# Patient Record
Sex: MALE | Race: WHITE | Employment: OTHER | URBAN - METROPOLITAN AREA
[De-identification: names, ages, dates, MRNs, and addresses within clinical notes are randomized per-mention and may not be internally consistent; named-entity substitution may affect disease eponyms.]

---

## 2017-10-05 ENCOUNTER — OFFICE VISIT (OUTPATIENT)
Dept: INTERNAL MEDICINE CLINIC | Age: 77
End: 2017-10-05
Payer: MEDICARE

## 2017-10-05 VITALS
BODY MASS INDEX: 26.41 KG/M2 | SYSTOLIC BLOOD PRESSURE: 134 MMHG | WEIGHT: 195 LBS | HEIGHT: 72 IN | DIASTOLIC BLOOD PRESSURE: 68 MMHG | OXYGEN SATURATION: 95 % | HEART RATE: 48 BPM

## 2017-10-05 DIAGNOSIS — Z00.00 ROUTINE ADULT HEALTH MAINTENANCE: ICD-10-CM

## 2017-10-05 DIAGNOSIS — G89.29 CHRONIC MIDLINE LOW BACK PAIN WITH SCIATICA, SCIATICA LATERALITY UNSPECIFIED: ICD-10-CM

## 2017-10-05 DIAGNOSIS — G62.9 PERIPHERAL POLYNEUROPATHY: ICD-10-CM

## 2017-10-05 DIAGNOSIS — M54.40 CHRONIC MIDLINE LOW BACK PAIN WITH SCIATICA, SCIATICA LATERALITY UNSPECIFIED: ICD-10-CM

## 2017-10-05 DIAGNOSIS — E11.9 TYPE 2 DIABETES MELLITUS WITHOUT COMPLICATION, WITHOUT LONG-TERM CURRENT USE OF INSULIN (HCC): ICD-10-CM

## 2017-10-05 DIAGNOSIS — E03.9 HYPOTHYROIDISM, UNSPECIFIED TYPE: ICD-10-CM

## 2017-10-05 DIAGNOSIS — Z23 NEED FOR VACCINATION: Primary | ICD-10-CM

## 2017-10-05 PROCEDURE — G0009 ADMIN PNEUMOCOCCAL VACCINE: HCPCS | Performed by: INTERNAL MEDICINE

## 2017-10-05 PROCEDURE — 99204 OFFICE O/P NEW MOD 45 MIN: CPT | Performed by: INTERNAL MEDICINE

## 2017-10-05 PROCEDURE — 90662 IIV NO PRSV INCREASED AG IM: CPT | Performed by: INTERNAL MEDICINE

## 2017-10-05 PROCEDURE — G0008 ADMIN INFLUENZA VIRUS VAC: HCPCS | Performed by: INTERNAL MEDICINE

## 2017-10-05 PROCEDURE — 90670 PCV13 VACCINE IM: CPT | Performed by: INTERNAL MEDICINE

## 2017-10-05 RX ORDER — DOCUSATE SODIUM 100 MG/1
100 CAPSULE, LIQUID FILLED ORAL DAILY
COMMUNITY
End: 2021-06-08 | Stop reason: ALTCHOICE

## 2017-10-05 RX ORDER — TRAMADOL HYDROCHLORIDE 50 MG/1
50 TABLET ORAL EVERY 4 HOURS
COMMUNITY
End: 2019-01-10 | Stop reason: ALTCHOICE

## 2017-10-05 RX ORDER — LANOLIN ALCOHOL/MO/W.PET/CERES
1000 CREAM (GRAM) TOPICAL DAILY
COMMUNITY
End: 2021-06-08

## 2017-10-05 RX ORDER — PREGABALIN 50 MG/1
50 CAPSULE ORAL 3 TIMES DAILY
COMMUNITY
End: 2017-11-06 | Stop reason: SDUPTHER

## 2017-10-05 RX ORDER — GLUCOSAMINE HCL/CHONDROITIN SU 500-400 MG
CAPSULE ORAL
Qty: 100 STRIP | Refills: 0 | Status: SHIPPED | OUTPATIENT
Start: 2017-10-05 | End: 2018-01-22 | Stop reason: SDUPTHER

## 2017-10-05 RX ORDER — MULTIVIT WITH MINERALS/LUTEIN
1000 TABLET ORAL DAILY
COMMUNITY
End: 2021-06-08

## 2017-10-05 RX ORDER — ASPIRIN 325 MG
325 TABLET ORAL EVERY 6 HOURS PRN
Status: ON HOLD | COMMUNITY
End: 2020-02-10 | Stop reason: HOSPADM

## 2017-10-05 RX ORDER — ALPRAZOLAM 0.25 MG/1
0.25 TABLET ORAL PRN
COMMUNITY
End: 2018-06-28

## 2017-10-05 RX ORDER — MELATONIN
1000 DAILY
COMMUNITY
End: 2021-06-08

## 2017-10-05 RX ORDER — LEVOTHYROXINE SODIUM 0.12 MG/1
125 TABLET ORAL DAILY
COMMUNITY
End: 2017-11-06 | Stop reason: SDUPTHER

## 2017-10-05 RX ORDER — LATANOPROST 50 UG/ML
1 SOLUTION/ DROPS OPHTHALMIC DAILY
COMMUNITY
End: 2020-04-20 | Stop reason: ALTCHOICE

## 2017-10-05 ASSESSMENT — ENCOUNTER SYMPTOMS
COLOR CHANGE: 0
FACIAL SWELLING: 0
ABDOMINAL PAIN: 0
SHORTNESS OF BREATH: 0
APNEA: 0
CONSTIPATION: 0
CHEST TIGHTNESS: 0
COUGH: 0
ABDOMINAL DISTENTION: 0
BACK PAIN: 1
DIARRHEA: 0
WHEEZING: 0

## 2017-10-05 ASSESSMENT — PATIENT HEALTH QUESTIONNAIRE - PHQ9
SUM OF ALL RESPONSES TO PHQ9 QUESTIONS 1 & 2: 2
2. FEELING DOWN, DEPRESSED OR HOPELESS: 1
SUM OF ALL RESPONSES TO PHQ QUESTIONS 1-9: 2
1. LITTLE INTEREST OR PLEASURE IN DOING THINGS: 1

## 2017-10-05 NOTE — PROGRESS NOTES
Subjective:      Patient ID: Marino Hu is a 76 y.o. male. Visit Information    Have you changed or started any medications since your last visit including any over-the-counter medicines, vitamins, or herbal medicines? no   Are you having any side effects from any of your medications? -  no  Have you stopped taking any of your medications? Is so, why? -  no    Have you seen any other physician or provider since your last visit? No  Have you had any other diagnostic tests since your last visit? No  Have you been seen in the emergency room and/or had an admission to a hospital since we last saw you? No  Have you had your routine dental cleaning in the past 6 months? no    Have you activated your [employer] account? If not, what are your barriers? No: Inactive     Patient Care Team:  Tamiko Fernando MD as PCP - General (Internal Medicine)    Medical History Review  Past Medical, Family, and Social History reviewed and does not contribute to the patient presenting condition    Health Maintenance   Topic Date Due    DTaP/Tdap/Td vaccine (1 - Tdap) 09/20/1957    Lipid screen  09/20/1978    Zostavax vaccine  09/20/1998    Pneumococcal low/med risk (1 of 2 - PCV13) 09/20/2003    Flu vaccine (1) 09/01/2017     Chief Complaint   Patient presents with   [facility] Doctor     new to office    Other     pt due for pneumo and flu shot       HPI- patient is here to establish care. He has multiple medical problems which include diabetes, hypothyroidism, chronic back pain. Patient mentioned that he is not taking any medication for long time. He is checking his blood sugars every day, most of his fasting readings are within 120 and 130. He has seen by the orthopedic surgeon in [facility], he has taken multiple shots in his back. He also mentioned that he has bad peripheral neuropathy, likely secondary to diabetes. Patient is taking Lyrica for that .     Review of Systems   Constitutional: Negative for activity change, appetite change, chills and diaphoresis. HENT: Negative for congestion, dental problem, ear discharge, facial swelling and hearing loss. Respiratory: Negative for apnea, cough, chest tightness, shortness of breath and wheezing. Cardiovascular: Negative for chest pain and leg swelling. Gastrointestinal: Negative for abdominal distention, abdominal pain, constipation and diarrhea. Genitourinary: Negative for difficulty urinating, dysuria, enuresis, flank pain and frequency. Musculoskeletal: Positive for arthralgias (Both shoulders and LOwer Back ) and back pain. Negative for gait problem and joint swelling. Skin: Negative for color change, pallor and rash. Neurological: Positive for numbness (both legs ). Negative for dizziness, seizures, facial asymmetry, light-headedness and headaches. Psychiatric/Behavioral: Negative for agitation, behavioral problems, confusion, decreased concentration and dysphoric mood. Objective:   Physical Exam   Constitutional: He is oriented to person, place, and time. He appears well-developed and well-nourished. No distress. HENT:   Head: Normocephalic and atraumatic. Mouth/Throat: No oropharyngeal exudate. Eyes: Conjunctivae are normal. Pupils are equal, round, and reactive to light. Right eye exhibits no discharge. Left eye exhibits no discharge. No scleral icterus. Neck: Normal range of motion. Neck supple. No JVD present. No tracheal deviation present. No thyromegaly present. Cardiovascular: Normal rate and normal heart sounds. Exam reveals no gallop. No murmur heard. Pulmonary/Chest: Effort normal and breath sounds normal. No stridor. No respiratory distress. He has no wheezes. He has no rales. Abdominal: Soft. Bowel sounds are normal. He exhibits no distension. There is no tenderness. There is no rebound and no guarding. Musculoskeletal: Normal range of motion. He exhibits tenderness (LOwer back ). He exhibits no edema.    Neurological: He is alert and oriented to person, place, and time. Skin: Skin is warm and dry. No rash noted. He is not diaphoretic. No erythema. Nursing note and vitals reviewed. Assessment:       1. Need for vaccination    2. Routine adult health maintenance    3. Chronic midline low back pain with sciatica, sciatica laterality unspecified    4. Type 2 diabetes mellitus without complication, without long-term current use of insulin ([facility]    5. Hypothyroidism, unspecified type    6. Peripheral polyneuropathy ([facility] [address]              Plan:      1. Need for vaccination  - Pneumococcal conjugate vaccine 13-valent IM (PREVNAR 13)  - INFLUENZA, HIGH DOSE, 65 YRS +, IM, PF, PREFILL SYR, 0.5ML (FLUZONE HD)    2. Routine adult health maintenance  - Lipid Panel; Future  - Comprehensive Metabolic Panel; Future  - CBC; Future    3. Chronic midline low back pain with sciatica, sciatica laterality unspecified  - Handicap placard    Physical therapy referral    4. Type 2 diabetes mellitus without complication, without long-term current use of insulin (HCC)  - Hemoglobin A1C; Future  - Microalbumin, Ur  - Glucose Blood (BLOOD GLUCOSE TEST STRIPS) STRP; Use 2-3 times a day  Dispense: 100 strip; Refill: 0    5. Hypothyroidism, unspecified type  - TSH With Reflex Ft4; Future    6. Peripheral polyneuropathy ([facility] [address]- patient is taking Lyrica     Patient is coming for stent to the office, he is requesting to sign a document for income taxes redemption on disability grounds, I would have for him to physical therapist for assessment of his function capacity. · Return in about 4 weeks (around 11/2/2017). · Benigno Valdez received counseling on the following healthy behaviors: nutrition, exercise and medication adherence    · Reviewed prior labs and health maintenance. · Discussed use, benefit, and side effects of prescribed medications. Barriers to medication compliance addressed. All patient questions answered. Pt voiced understanding.

## 2017-10-05 NOTE — MR AVS SNAPSHOT
After Visit Summary             Tom Truong   10/5/2017 10:00 AM   Office Visit    Description:  Male : 1940   Provider:  Marjorie Rogers MD   Department:  [facility]              Your Follow-Up and Future Appointments         Below is a list of your follow-up and future appointments. This may not be a complete list as you may have made appointments directly with providers that we are not aware of or your providers may have made some for you. Please call your providers to confirm appointments. It is important to keep your appointments. Please bring your current insurance card, photo ID, co-pay, and all medication bottles to your appointment. If self-pay, payment is expected at the time of service. Your To-Do List     Future Appointments Provider Department Dept Phone    2017 10:00 AM Marjorie Rogers MD [facility] [phone]    Please arrive 15 minutes prior to appointment, bring photo ID and insurance card. Future Orders Complete By Expires    CBC [SQF960 Custom]  10/5/2017 10/5/2018    Hemoglobin A1C [LAB90 Custom]  10/5/2017 10/5/2018    Lipid Panel [LAB18 Custom]  10/5/2017 10/5/2018    TSH With Reflex Ft4 [YJS9062 Custom]  10/5/2017 10/5/2018    Comprehensive Metabolic Panel [RMT23 Custom]  2018 10/4/2018    Comments:    Please get this labwork done before your next visit. Follow-Up    Return in about 4 weeks (around 2017).          Information from Your Visit        Department     Name Address Phone Fax    [address] [address] [phone]      You Were Seen for:         Comments    Need for vaccination   [[id number]]         Vital Signs     Blood Pressure Pulse Height Weight Oxygen Saturation Body Mass Index    134/68 48 6' (1.829 m) 195 lb (88.5 kg) 95% 26.45 kg/m2    Smoking Status                   Former Smoker           Additional Information about your Body Mass Index (BMI) Your BMI as listed above is considered overweight (25.0-29.9). BMI is an estimate of body fat, calculated from your height and weight. The higher your BMI, the greater your risk of heart disease, high blood pressure, type 2 diabetes, stroke, gallstones, arthritis, sleep apnea, and certain cancers. BMI is not perfect. It may overestimate body fat in athletes and people who are more muscular. If your body fat is high you can improve your BMI by decreasing your calorie intake and becoming more physically active. Learn more at: [employer] [place]             Today's Medication Changes          These changes are accurate as of: 10/5/17 11:12 AM.  If you have any questions, ask your nurse or doctor.                START taking these medications           BLOOD GLUCOSE TEST STRIPS Strp   Instructions:  Use 2-3 times a day   Quantity:  100 strip   Refills:  0   Started by:  Marjorie Rogers MD            Where to Get Your Medications      These medications were sent to [facility], [address] [address]     Phone:  [phone]     BLOOD GLUCOSE TEST STRIPS Strp               Your Current Medications Are              traMADol (ULTRAM) 50 MG tablet Take 50 mg by mouth every 4 hours    ALPRAZolam (XANAX) 0.25 MG tablet Take 0.25 mg by mouth as needed for Sleep    latanoprost (XALATAN) 0.005 % ophthalmic solution Place 1 drop into both eyes daily    pregabalin (LYRICA) 50 MG capsule Take 50 mg by mouth 3 times daily    levothyroxine (SYNTHROID) 125 MCG tablet Take 125 mcg by mouth Daily    ROSUVASTATIN CALCIUM PO Take by mouth    insulin regular (HUMULIN R;NOVOLIN R) 100 UNIT/ML injection Inject into the skin See Admin Instructions    Naproxen Sodium (ALEVE PO) Take by mouth    aspirin 325 MG tablet Take 325 mg by mouth daily [email or web address] Access Code: [email or web address]  Expires: 12/4/2017 11:12 AM    4. Enter your Social Security Number ([id number]) and Date of Birth (mm/dd/yyyy) as indicated and click Submit. You will be taken to the next sign-up page. 5. Create a [email or web address] ID. This will be your [email or web address] login ID and cannot be changed, so think of one that is secure and easy to remember. 6. Create a [email or web address] password. You can change your password at any time. 7. Enter your Password Reset Question and Answer. This can be used at a later time if you forget your password. 8. Enter your e-mail address. You will receive e-mail notification when new information is available in [address] 9. Click Sign Up. You can now view your medical record. Additional Information  If you have questions, please contact the physician practice where you receive care. Remember, [email or web address] is NOT to be used for urgent needs. For medical emergencies, dial 911. For questions regarding your [email or web address] account call [phone]. If you have a clinical question, please call your doctor's office.

## 2017-10-09 ENCOUNTER — HOSPITAL ENCOUNTER (OUTPATIENT)
Age: 77
Setting detail: SPECIMEN
Discharge: HOME | End: 2017-10-09
Payer: MEDICARE

## 2017-10-09 DIAGNOSIS — E11.9 TYPE 2 DIABETES MELLITUS WITHOUT COMPLICATION, WITHOUT LONG-TERM CURRENT USE OF INSULIN (HCC): ICD-10-CM

## 2017-10-09 DIAGNOSIS — E03.9 HYPOTHYROIDISM, UNSPECIFIED TYPE: ICD-10-CM

## 2017-10-09 DIAGNOSIS — Z00.00 ROUTINE ADULT HEALTH MAINTENANCE: ICD-10-CM

## 2017-10-09 LAB
ALBUMIN SERPL-MCNC: 4.1 G/DL (ref 3.5–5.2)
ALBUMIN/GLOBULIN RATIO: 1.5 (ref 1–2.5)
ALP BLD-CCNC: 82 U/L (ref 40–129)
ALT SERPL-CCNC: 43 U/L (ref 5–41)
ANION GAP SERPL CALCULATED.3IONS-SCNC: 11 MMOL/L (ref 9–17)
AST SERPL-CCNC: 37 U/L
BILIRUB SERPL-MCNC: 1 MG/DL (ref 0.3–1.2)
BUN BLDV-MCNC: 11 MG/DL (ref 8–23)
BUN/CREAT BLD: ABNORMAL (ref 9–20)
CALCIUM SERPL-MCNC: 9.7 MG/DL (ref 8.6–10.4)
CHLORIDE BLD-SCNC: 105 MMOL/L (ref 98–107)
CHOLESTEROL/HDL RATIO: 3.2
CHOLESTEROL: 119 MG/DL
CO2: 26 MMOL/L (ref 20–31)
CREAT SERPL-MCNC: 0.51 MG/DL (ref 0.7–1.2)
ESTIMATED AVERAGE GLUCOSE: 146 MG/DL
GFR AFRICAN AMERICAN: >60 ML/MIN
GFR NON-AFRICAN AMERICAN: >60 ML/MIN
GFR SERPL CREATININE-BSD FRML MDRD: ABNORMAL ML/MIN/{1.73_M2}
GFR SERPL CREATININE-BSD FRML MDRD: ABNORMAL ML/MIN/{1.73_M2}
GLUCOSE BLD-MCNC: 127 MG/DL (ref 70–99)
HBA1C MFR BLD: 6.7 % (ref 4–6)
HCT VFR BLD CALC: 42.5 % (ref 41–53)
HDLC SERPL-MCNC: 37 MG/DL
HEMOGLOBIN: 14.8 G/DL (ref 13.5–17.5)
LDL CHOLESTEROL: 58 MG/DL (ref 0–130)
MCH RBC QN AUTO: 30.6 PG (ref 26–34)
MCHC RBC AUTO-ENTMCNC: 34.7 G/DL (ref 31–37)
MCV RBC AUTO: 88.4 FL (ref 80–100)
PDW BLD-RTO: 13.6 % (ref 12.5–15.4)
PLATELET # BLD: 241 K/UL (ref 140–450)
PMV BLD AUTO: 7.4 FL (ref 6–12)
POTASSIUM SERPL-SCNC: 4.2 MMOL/L (ref 3.7–5.3)
RBC # BLD: 4.81 M/UL (ref 4.5–5.9)
SODIUM BLD-SCNC: 142 MMOL/L (ref 135–144)
TOTAL PROTEIN: 6.9 G/DL (ref 6.4–8.3)
TRIGL SERPL-MCNC: 122 MG/DL
TSH SERPL DL<=0.05 MIU/L-ACNC: 1.79 MIU/L (ref 0.3–5)
VLDLC SERPL CALC-MCNC: ABNORMAL MG/DL (ref 1–30)
WBC # BLD: 5.6 K/UL (ref 3.5–11)

## 2017-11-06 ENCOUNTER — OFFICE VISIT (OUTPATIENT)
Dept: INTERNAL MEDICINE CLINIC | Age: 77
End: 2017-11-06
Payer: MEDICARE

## 2017-11-06 ENCOUNTER — TELEPHONE (OUTPATIENT)
Dept: INTERNAL MEDICINE CLINIC | Age: 77
End: 2017-11-06

## 2017-11-06 VITALS
DIASTOLIC BLOOD PRESSURE: 84 MMHG | OXYGEN SATURATION: 98 % | HEART RATE: 48 BPM | HEIGHT: 72 IN | BODY MASS INDEX: 26.14 KG/M2 | SYSTOLIC BLOOD PRESSURE: 134 MMHG | WEIGHT: 193 LBS

## 2017-11-06 DIAGNOSIS — G62.9 PERIPHERAL POLYNEUROPATHY: ICD-10-CM

## 2017-11-06 DIAGNOSIS — E03.9 HYPOTHYROIDISM, UNSPECIFIED TYPE: ICD-10-CM

## 2017-11-06 DIAGNOSIS — E11.9 TYPE 2 DIABETES MELLITUS WITHOUT COMPLICATION, WITHOUT LONG-TERM CURRENT USE OF INSULIN (HCC): Primary | ICD-10-CM

## 2017-11-06 DIAGNOSIS — R00.1 BRADYCARDIA: ICD-10-CM

## 2017-11-06 DIAGNOSIS — K21.9 GASTROESOPHAGEAL REFLUX DISEASE WITHOUT ESOPHAGITIS: ICD-10-CM

## 2017-11-06 PROCEDURE — 99213 OFFICE O/P EST LOW 20 MIN: CPT | Performed by: INTERNAL MEDICINE

## 2017-11-06 RX ORDER — LEVOTHYROXINE SODIUM 0.12 MG/1
125 TABLET ORAL DAILY
Qty: 30 TABLET | Refills: 3 | Status: SHIPPED | OUTPATIENT
Start: 2017-11-06 | End: 2018-03-21 | Stop reason: SDUPTHER

## 2017-11-06 RX ORDER — PREGABALIN 25 MG/1
50 CAPSULE ORAL 2 TIMES DAILY
Qty: 60 CAPSULE | Refills: 2 | Status: SHIPPED | OUTPATIENT
Start: 2017-11-06 | End: 2018-04-16 | Stop reason: SDUPTHER

## 2017-11-06 ASSESSMENT — ENCOUNTER SYMPTOMS
BACK PAIN: 1
WHEEZING: 0
ABDOMINAL DISTENTION: 0
ABDOMINAL PAIN: 0
COUGH: 0
COLOR CHANGE: 0
APNEA: 0
DIARRHEA: 0
FACIAL SWELLING: 0
SHORTNESS OF BREATH: 0
CHEST TIGHTNESS: 0
CONSTIPATION: 0

## 2017-11-06 NOTE — PROGRESS NOTES
Subjective:      Patient ID: Lexis Evans is a 76 y.o. male. HPI- patient is here for follow-up examination. He was seen earlier last month, he has diet-controlled diabetes and hypothyroidism. He was not taking his Synthroid for over 1 week. He has lab work done recently, no abnormality detected    Review of Systems   Constitutional: Negative for activity change, appetite change, chills and diaphoresis. HENT: Negative for congestion, dental problem, ear discharge, facial swelling and hearing loss. Respiratory: Negative for apnea, cough, chest tightness, shortness of breath and wheezing. Cardiovascular: Negative for chest pain and leg swelling. Gastrointestinal: Negative for abdominal distention, abdominal pain, constipation and diarrhea. Abdominal Burning sensation      Genitourinary: Negative for difficulty urinating, dysuria, enuresis, flank pain and frequency. Musculoskeletal: Positive for arthralgias, back pain and gait problem. Negative for joint swelling. Uses cane      Skin: Negative for color change, pallor and rash. Neurological: Negative for dizziness, seizures, facial asymmetry, light-headedness, numbness and headaches. Psychiatric/Behavioral: Negative for agitation, behavioral problems, confusion, decreased concentration and dysphoric mood. Objective:   Physical Exam   Constitutional: He is oriented to person, place, and time. He appears well-developed and well-nourished. HENT:   Head: Normocephalic and atraumatic. Mouth/Throat: No oropharyngeal exudate. Eyes: Conjunctivae are normal. Pupils are equal, round, and reactive to light. Right eye exhibits no discharge. Left eye exhibits no discharge. No scleral icterus. Neck: Normal range of motion. Neck supple. No JVD present. No tracheal deviation present. No thyromegaly present. Cardiovascular: Normal rate and normal heart sounds. Exam reveals no gallop. No murmur heard.   Pulmonary/Chest: Effort normal and breath sounds normal. No stridor. No respiratory distress. He has no wheezes. He has no rales. He exhibits no tenderness. Abdominal: Soft. Bowel sounds are normal. He exhibits no distension. There is no tenderness. There is no rebound and no guarding. Musculoskeletal: Normal range of motion. He exhibits tenderness (back lower ). He exhibits no edema. Neurological: He is alert and oriented to person, place, and time. Skin: He is not diaphoretic. Assessment:       1. Type 2 diabetes mellitus without complication, without long-term current use of insulin ([address]    2. Hypothyroidism, unspecified type    3. Peripheral polyneuropathy ([address]    4. Gastroesophageal reflux disease without esophagitis    5. Bradycardia            Plan:     1. Type 2 diabetes mellitus without complication, without long-term current use of insulin ([address]- diet controlled, last HbA1c was 6.7    2. Hypothyroidism, unspecified type  - levothyroxine (SYNTHROID) 125 MCG tablet; Take 1 tablet by mouth daily  Dispense: 30 tablet; Refill: 3    3. Peripheral polyneuropathy ([address]    Patient is taking Lyrica as and when needed, he was explained that this medication is to be taken every day  Reduced the dose from 50 mg 3 times a day to 25 mg twice a day  - pregabalin (LYRICA) 25 MG capsule; Take 2 capsules by mouth 2 times daily  Dispense: 60 capsule; Refill: 2    4. Gastroesophageal reflux disease without esophagitis- patient does not want to take any medication for his GERD  Advised to cut down coffee intake, literature   provided    5. Bradycardia- his heart rate was 48, EKG was done in the office suggestive of bradycardia    · Return in about 3 months (around 2/6/2018). · Rosio Lazar received counseling on the following healthy behaviors: nutrition, exercise and medication adherence    · Reviewed prior labs and health maintenance. · Discussed use, benefit, and side effects of prescribed medications.   Barriers to medication

## 2017-11-06 NOTE — TELEPHONE ENCOUNTER
I have discussion with the patient, he wanted to on the dose to come from 50 mg 3 times a day to 25 mg twice a day.   Please addend the prescription

## 2017-11-06 NOTE — PATIENT INSTRUCTIONS
Patient Education        Gastroesophageal Reflux Disease (GERD): Care Instructions  Your Care Instructions    Gastroesophageal reflux disease (GERD) is the backward flow of stomach acid into the esophagus. The esophagus is the tube that leads from your throat to your stomach. A one-way valve prevents the stomach acid from moving up into this tube. When you have GERD, this valve does not close tightly enough. If you have mild GERD symptoms including heartburn, you may be able to control the problem with antacids or over-the-counter medicine. Changing your diet, losing weight, and making other lifestyle changes can also help reduce symptoms. Follow-up care is a key part of your treatment and safety. Be sure to make and go to all appointments, and call your doctor if you are having problems. Its also a good idea to know your test results and keep a list of the medicines you take. How can you care for yourself at home? · Take your medicines exactly as prescribed. Call your doctor if you think you are having a problem with your medicine. · Your doctor may recommend over-the-counter medicine. For mild or occasional indigestion, antacids, such as Tums, Gaviscon, Mylanta, or Maalox, may help. Your doctor also may recommend over-the-counter acid reducers, such as Pepcid AC, Tagamet HB, Zantac 75, or Prilosec. Read and follow all instructions on the label. If you use these medicines often, talk with your doctor. · Change your eating habits. ¨ Its best to eat several small meals instead of two or three large meals. ¨ After you eat, wait 2 to 3 hours before you lie down. ¨ Chocolate, mint, and alcohol can make GERD worse. ¨ Spicy foods, foods that have a lot of acid (like tomatoes and oranges), and coffee can make GERD symptoms worse in some people. If your symptoms are worse after you eat a certain food, you may want to stop eating that food to see if your symptoms get better.   · Do not smoke or chew tobacco.

## 2017-11-06 NOTE — TELEPHONE ENCOUNTER
lyrica was prescribed today. Came to pharmacy as 25mg take 2 tablets BID but only a qty of 60. Is he supposed to be taking 50mg BID? Or just 25mg BID?

## 2017-11-06 NOTE — PROGRESS NOTES
Subjective:      Patient ID: Yvoana lilia Grider is a 76 y.o. male. Chronic Disease Visit Information    BP Readings from Last 3 Encounters:   11/06/17 134/84   10/05/17 134/68          Hemoglobin A1C (%)   Date Value   10/09/2017 6.7 (H)     LDL Cholesterol (mg/dL)   Date Value   10/09/2017 58     HDL (mg/dL)   Date Value   10/09/2017 37 (L)     BUN (mg/dL)   Date Value   10/09/2017 11     CREATININE (mg/dL)   Date Value   10/09/2017 0.51 (L)     Glucose (mg/dL)   Date Value   10/09/2017 127 (H)            Have you changed or started any medications since your last visit including any over-the-counter medicines, vitamins, or herbal medicines? no   Are you having any side effects from any of your medications? -  no  Have you stopped taking any of your medications? Is so, why? -  no    Have you seen any other physician or provider since your last visit? No  Have you had any other diagnostic tests since your last visit? No  Have you been seen in the emergency room and/or had an admission to a hospital since we last saw you? No  Have you had your annual diabetic retinal (eye) exam? Yes  Have you had your routine dental cleaning in the past 6 months? no    Have you activated your [employer] account? If not, what are your barriers?  No: Pending     Patient Care Team:  Nas Roque MD as PCP - General (Internal Medicine)         Medical History Review  Past Medical, Family, and Social History reviewed and does not contribute to the patient presenting condition    Health Maintenance   Topic Date Due    DTaP/Tdap/Td vaccine (1 - Tdap) 09/20/1957    Zostavax vaccine  09/20/1998    Pneumococcal low/med risk (2 of 2 - PPSV23) 10/05/2018    Lipid screen  10/09/2022    Flu vaccine  Completed     Chief Complaint   Patient presents with    1 Month Follow-Up     pt here for 1 month check up    Diabetes     management pt due for microalbumine but unable to give urine sample       HPI    Review of Systems    Objective:   Physical Exam    Assessment:            Plan:

## 2018-01-22 DIAGNOSIS — E11.9 TYPE 2 DIABETES MELLITUS WITHOUT COMPLICATION, WITHOUT LONG-TERM CURRENT USE OF INSULIN (HCC): ICD-10-CM

## 2018-01-22 RX ORDER — GLUCOSAMINE HCL/CHONDROITIN SU 500-400 MG
CAPSULE ORAL
Qty: 100 STRIP | Refills: 0 | Status: SHIPPED | OUTPATIENT
Start: 2018-01-22 | End: 2018-04-16 | Stop reason: SDUPTHER

## 2018-02-20 ENCOUNTER — OFFICE VISIT (OUTPATIENT)
Dept: INTERNAL MEDICINE CLINIC | Age: 78
End: 2018-02-20
Payer: MEDICARE

## 2018-02-20 VITALS
DIASTOLIC BLOOD PRESSURE: 82 MMHG | HEART RATE: 74 BPM | HEIGHT: 72 IN | BODY MASS INDEX: 26.55 KG/M2 | SYSTOLIC BLOOD PRESSURE: 128 MMHG | WEIGHT: 196 LBS | OXYGEN SATURATION: 96 %

## 2018-02-20 DIAGNOSIS — K21.00 GASTROESOPHAGEAL REFLUX DISEASE WITH ESOPHAGITIS: ICD-10-CM

## 2018-02-20 DIAGNOSIS — G89.29 CHRONIC MIDLINE LOW BACK PAIN WITH SCIATICA, SCIATICA LATERALITY UNSPECIFIED: ICD-10-CM

## 2018-02-20 DIAGNOSIS — E03.9 HYPOTHYROIDISM, UNSPECIFIED TYPE: ICD-10-CM

## 2018-02-20 DIAGNOSIS — M54.40 CHRONIC MIDLINE LOW BACK PAIN WITH SCIATICA, SCIATICA LATERALITY UNSPECIFIED: ICD-10-CM

## 2018-02-20 DIAGNOSIS — E11.9 TYPE 2 DIABETES MELLITUS WITHOUT COMPLICATION, WITHOUT LONG-TERM CURRENT USE OF INSULIN (HCC): Primary | ICD-10-CM

## 2018-02-20 LAB — HBA1C MFR BLD: 6.6 %

## 2018-02-20 PROCEDURE — 83036 HEMOGLOBIN GLYCOSYLATED A1C: CPT | Performed by: INTERNAL MEDICINE

## 2018-02-20 PROCEDURE — 99214 OFFICE O/P EST MOD 30 MIN: CPT | Performed by: INTERNAL MEDICINE

## 2018-02-20 RX ORDER — CHLORAL HYDRATE 500 MG
1000 CAPSULE ORAL DAILY
COMMUNITY
End: 2021-06-08

## 2018-02-20 RX ORDER — OMEPRAZOLE 20 MG/1
20 CAPSULE, DELAYED RELEASE ORAL 2 TIMES DAILY
Qty: 30 CAPSULE | Refills: 3 | Status: SHIPPED | OUTPATIENT
Start: 2018-02-20 | End: 2018-06-28

## 2018-02-20 ASSESSMENT — ENCOUNTER SYMPTOMS
WHEEZING: 0
SHORTNESS OF BREATH: 0
COUGH: 0
FACIAL SWELLING: 0
CHEST TIGHTNESS: 0
DIARRHEA: 0
BACK PAIN: 1
ABDOMINAL DISTENTION: 0
APNEA: 0
COLOR CHANGE: 0
CONSTIPATION: 0
ABDOMINAL PAIN: 1

## 2018-02-20 NOTE — PROGRESS NOTES
sounds are normal. He exhibits no distension. There is no tenderness. There is no rebound and no guarding. Musculoskeletal: Normal range of motion. He exhibits no edema or tenderness. Neurological: He is alert and oriented to person, place, and time. Skin: Skin is warm and dry. No rash noted. He is not diaphoretic. No erythema. Nursing note and vitals reviewed. Assessment:       1. Type 2 diabetes mellitus without complication, without long-term current use of insulin ([address]    2. Hypothyroidism, unspecified type    3. Gastroesophageal reflux disease with esophagitis    4. Chronic midline low back pain with sciatica, sciatica laterality unspecified              Plan:     1. Type 2 diabetes mellitus without complication, without long-term current use of insulin ([facility])  - POCT glycosylated hemoglobin (Hb A1C)    2. Hypothyroidism, unspecified type  - TSH; Future    3. Gastroesophageal reflux disease with esophagitis    - omeprazole (PRILOSEC) 20 MG delayed release capsule; Take 1 capsule by mouth 2 times daily  Dispense: 30 capsule; Refill: 3    4. Chronic midline low back pain with sciatica, sciatica laterality unspecified- on Aspirin PRN      · Return in about 4 months (around 6/20/2018). · Reviewed prior labs and health maintenance. · Discussed use, benefit, and side effects of prescribed medications. Barriers to medication compliance addressed. All patient questions answered. Pt voiced understanding.          MD RYNE Pearl [facility]  2/20/2018, 11:45 AM

## 2018-02-23 ENCOUNTER — HOSPITAL ENCOUNTER (OUTPATIENT)
Age: 78
Setting detail: SPECIMEN
Discharge: HOME | End: 2018-02-23
Payer: MEDICARE

## 2018-02-23 DIAGNOSIS — E03.9 HYPOTHYROIDISM, UNSPECIFIED TYPE: ICD-10-CM

## 2018-02-23 LAB — TSH SERPL DL<=0.05 MIU/L-ACNC: 1.9 MIU/L (ref 0.3–5)

## 2018-03-21 DIAGNOSIS — E03.9 HYPOTHYROIDISM, UNSPECIFIED TYPE: ICD-10-CM

## 2018-03-22 RX ORDER — LEVOTHYROXINE SODIUM 0.12 MG/1
TABLET ORAL
Qty: 30 TABLET | Refills: 11 | Status: SHIPPED | OUTPATIENT
Start: 2018-03-22 | End: 2019-03-13 | Stop reason: SDUPTHER

## 2018-04-16 DIAGNOSIS — G62.9 PERIPHERAL POLYNEUROPATHY: ICD-10-CM

## 2018-04-16 DIAGNOSIS — E11.9 TYPE 2 DIABETES MELLITUS WITHOUT COMPLICATION, WITHOUT LONG-TERM CURRENT USE OF INSULIN (HCC): ICD-10-CM

## 2018-04-16 DIAGNOSIS — Z79.899 DRUG THERAPY: Primary | ICD-10-CM

## 2018-04-16 RX ORDER — BLOOD SUGAR DIAGNOSTIC
STRIP MISCELLANEOUS
Qty: 100 STRIP | Refills: 3 | Status: SHIPPED | OUTPATIENT
Start: 2018-04-16 | End: 2019-05-16 | Stop reason: SDUPTHER

## 2018-04-17 RX ORDER — PREGABALIN 25 MG/1
50 CAPSULE ORAL 2 TIMES DAILY
Qty: 120 CAPSULE | Refills: 2 | Status: SHIPPED | OUTPATIENT
Start: 2018-04-17 | End: 2018-09-17 | Stop reason: ALTCHOICE

## 2018-04-25 ENCOUNTER — TELEPHONE (OUTPATIENT)
Dept: INTERNAL MEDICINE CLINIC | Age: 78
End: 2018-04-25

## 2018-06-13 ENCOUNTER — OFFICE VISIT (OUTPATIENT)
Dept: PODIATRY | Age: 78
End: 2018-06-13
Payer: MEDICARE

## 2018-06-13 VITALS
HEART RATE: 70 BPM | BODY MASS INDEX: 28.2 KG/M2 | DIASTOLIC BLOOD PRESSURE: 91 MMHG | SYSTOLIC BLOOD PRESSURE: 167 MMHG | HEIGHT: 70 IN | WEIGHT: 197 LBS

## 2018-06-13 DIAGNOSIS — M79.674 PAIN OF TOES OF BOTH FEET: ICD-10-CM

## 2018-06-13 DIAGNOSIS — E11.42 DIABETIC POLYNEUROPATHY ASSOCIATED WITH TYPE 2 DIABETES MELLITUS (HCC): ICD-10-CM

## 2018-06-13 DIAGNOSIS — M21.372 FOOT DROP, LEFT FOOT: ICD-10-CM

## 2018-06-13 DIAGNOSIS — E11.51 TYPE 2 DIABETES MELLITUS WITH PERIPHERAL VASCULAR DISEASE (HCC): ICD-10-CM

## 2018-06-13 DIAGNOSIS — B35.1 ONYCHOMYCOSIS OF TOENAIL: Primary | ICD-10-CM

## 2018-06-13 DIAGNOSIS — M79.675 PAIN OF TOES OF BOTH FEET: ICD-10-CM

## 2018-06-13 PROCEDURE — 11721 DEBRIDE NAIL 6 OR MORE: CPT | Performed by: PODIATRIST

## 2018-06-13 PROCEDURE — 99203 OFFICE O/P NEW LOW 30 MIN: CPT | Performed by: PODIATRIST

## 2018-06-13 ASSESSMENT — ENCOUNTER SYMPTOMS
NAUSEA: 0
SHORTNESS OF BREATH: 0
COLOR CHANGE: 0
BACK PAIN: 0
DIARRHEA: 0

## 2018-06-28 ENCOUNTER — OFFICE VISIT (OUTPATIENT)
Dept: INTERNAL MEDICINE CLINIC | Age: 78
End: 2018-06-28
Payer: MEDICARE

## 2018-06-28 VITALS
SYSTOLIC BLOOD PRESSURE: 150 MMHG | WEIGHT: 199 LBS | DIASTOLIC BLOOD PRESSURE: 80 MMHG | BODY MASS INDEX: 28.49 KG/M2 | HEIGHT: 70 IN

## 2018-06-28 DIAGNOSIS — E11.9 TYPE 2 DIABETES MELLITUS WITHOUT COMPLICATION, WITHOUT LONG-TERM CURRENT USE OF INSULIN (HCC): Primary | ICD-10-CM

## 2018-06-28 DIAGNOSIS — I10 ESSENTIAL HYPERTENSION: ICD-10-CM

## 2018-06-28 DIAGNOSIS — F41.1 GAD (GENERALIZED ANXIETY DISORDER): ICD-10-CM

## 2018-06-28 DIAGNOSIS — E03.9 HYPOTHYROIDISM, UNSPECIFIED TYPE: ICD-10-CM

## 2018-06-28 LAB — HBA1C MFR BLD: 6.8 %

## 2018-06-28 PROCEDURE — 99214 OFFICE O/P EST MOD 30 MIN: CPT | Performed by: INTERNAL MEDICINE

## 2018-06-28 PROCEDURE — G0444 DEPRESSION SCREEN ANNUAL: HCPCS | Performed by: INTERNAL MEDICINE

## 2018-06-28 PROCEDURE — 83036 HEMOGLOBIN GLYCOSYLATED A1C: CPT | Performed by: INTERNAL MEDICINE

## 2018-06-28 RX ORDER — LISINOPRIL 5 MG/1
5 TABLET ORAL DAILY
Qty: 30 TABLET | Refills: 3 | Status: SHIPPED | OUTPATIENT
Start: 2018-06-28 | End: 2018-09-28

## 2018-06-28 ASSESSMENT — PATIENT HEALTH QUESTIONNAIRE - PHQ9
2. FEELING DOWN, DEPRESSED OR HOPELESS: 3
SUM OF ALL RESPONSES TO PHQ9 QUESTIONS 1 & 2: 6
SUM OF ALL RESPONSES TO PHQ QUESTIONS 1-9: 9
6. FEELING BAD ABOUT YOURSELF - OR THAT YOU ARE A FAILURE OR HAVE LET YOURSELF OR YOUR FAMILY DOWN: 0
10. IF YOU CHECKED OFF ANY PROBLEMS, HOW DIFFICULT HAVE THESE PROBLEMS MADE IT FOR YOU TO DO YOUR WORK, TAKE CARE OF THINGS AT HOME, OR GET ALONG WITH OTHER PEOPLE: 1
9. THOUGHTS THAT YOU WOULD BE BETTER OFF DEAD, OR OF HURTING YOURSELF: 1
1. LITTLE INTEREST OR PLEASURE IN DOING THINGS: 3
4. FEELING TIRED OR HAVING LITTLE ENERGY: 1
8. MOVING OR SPEAKING SO SLOWLY THAT OTHER PEOPLE COULD HAVE NOTICED. OR THE OPPOSITE, BEING SO FIGETY OR RESTLESS THAT YOU HAVE BEEN MOVING AROUND A LOT MORE THAN USUAL: 1
5. POOR APPETITE OR OVEREATING: 0
7. TROUBLE CONCENTRATING ON THINGS, SUCH AS READING THE NEWSPAPER OR WATCHING TELEVISION: 0
3. TROUBLE FALLING OR STAYING ASLEEP: 0

## 2018-06-28 ASSESSMENT — ENCOUNTER SYMPTOMS
BACK PAIN: 0
SHORTNESS OF BREATH: 0
FACIAL SWELLING: 0
DIARRHEA: 0
APNEA: 0
COUGH: 0
CONSTIPATION: 0
ABDOMINAL PAIN: 0
WHEEZING: 0
CHEST TIGHTNESS: 0
COLOR CHANGE: 0
ABDOMINAL DISTENTION: 0

## 2018-09-17 ENCOUNTER — OFFICE VISIT (OUTPATIENT)
Dept: PODIATRY | Age: 78
End: 2018-09-17
Payer: MEDICARE

## 2018-09-17 VITALS
DIASTOLIC BLOOD PRESSURE: 79 MMHG | BODY MASS INDEX: 28.73 KG/M2 | SYSTOLIC BLOOD PRESSURE: 148 MMHG | HEIGHT: 69 IN | WEIGHT: 194 LBS | HEART RATE: 48 BPM

## 2018-09-17 DIAGNOSIS — M79.675 PAIN OF TOES OF BOTH FEET: ICD-10-CM

## 2018-09-17 DIAGNOSIS — E11.51 TYPE 2 DIABETES MELLITUS WITH PERIPHERAL VASCULAR DISEASE (HCC): ICD-10-CM

## 2018-09-17 DIAGNOSIS — B35.1 ONYCHOMYCOSIS OF TOENAIL: Primary | ICD-10-CM

## 2018-09-17 DIAGNOSIS — E11.42 DIABETIC POLYNEUROPATHY ASSOCIATED WITH TYPE 2 DIABETES MELLITUS (HCC): ICD-10-CM

## 2018-09-17 DIAGNOSIS — M79.674 PAIN OF TOES OF BOTH FEET: ICD-10-CM

## 2018-09-17 PROCEDURE — 99999 PR OFFICE/OUTPT VISIT,PROCEDURE ONLY: CPT | Performed by: PODIATRIST

## 2018-09-17 PROCEDURE — 11721 DEBRIDE NAIL 6 OR MORE: CPT | Performed by: PODIATRIST

## 2018-09-17 ASSESSMENT — ENCOUNTER SYMPTOMS
SHORTNESS OF BREATH: 0
NAUSEA: 0
DIARRHEA: 0
BACK PAIN: 0
COLOR CHANGE: 0

## 2018-09-28 ENCOUNTER — OFFICE VISIT (OUTPATIENT)
Dept: INTERNAL MEDICINE CLINIC | Age: 78
End: 2018-09-28
Payer: MEDICARE

## 2018-09-28 VITALS
SYSTOLIC BLOOD PRESSURE: 164 MMHG | DIASTOLIC BLOOD PRESSURE: 84 MMHG | HEIGHT: 69 IN | HEART RATE: 72 BPM | WEIGHT: 199 LBS | BODY MASS INDEX: 29.47 KG/M2

## 2018-09-28 DIAGNOSIS — E11.9 TYPE 2 DIABETES MELLITUS WITHOUT COMPLICATION, WITHOUT LONG-TERM CURRENT USE OF INSULIN (HCC): ICD-10-CM

## 2018-09-28 DIAGNOSIS — E03.9 HYPOTHYROIDISM, UNSPECIFIED TYPE: ICD-10-CM

## 2018-09-28 DIAGNOSIS — I10 ESSENTIAL HYPERTENSION: Primary | ICD-10-CM

## 2018-09-28 PROCEDURE — 99214 OFFICE O/P EST MOD 30 MIN: CPT | Performed by: INTERNAL MEDICINE

## 2018-09-28 RX ORDER — MEDICAL SUPPLY, MISCELLANEOUS
1 EACH MISCELLANEOUS DAILY
Qty: 1 EACH | Refills: 0 | Status: SHIPPED | OUTPATIENT
Start: 2018-09-28 | End: 2021-02-23

## 2018-09-28 RX ORDER — LISINOPRIL 5 MG/1
5 TABLET ORAL DAILY
Qty: 30 TABLET | Refills: 3 | Status: SHIPPED | OUTPATIENT
Start: 2018-09-28 | End: 2019-02-22 | Stop reason: ALTCHOICE

## 2018-09-28 ASSESSMENT — ENCOUNTER SYMPTOMS
CONSTIPATION: 0
APNEA: 0
COUGH: 0
ABDOMINAL PAIN: 0
COLOR CHANGE: 0
WHEEZING: 0
SHORTNESS OF BREATH: 0
DIARRHEA: 0
CHEST TIGHTNESS: 0
FACIAL SWELLING: 0
ABDOMINAL DISTENTION: 0
BACK PAIN: 0

## 2018-09-28 NOTE — PROGRESS NOTES
Subjective:      Patient ID: Jurgen Pierce is a [de-identified] y.o. male. Chronic Disease Visit Information    BP Readings from Last 3 Encounters:   09/17/18 (!) 148/79   06/28/18 (!) 150/80   06/13/18 (!) 167/91          Hemoglobin A1C (%)   Date Value   06/28/2018 6.8   02/20/2018 6.6   10/09/2017 6.7 (H)     LDL Cholesterol (mg/dL)   Date Value   10/09/2017 58     HDL (mg/dL)   Date Value   10/09/2017 37 (L)     BUN (mg/dL)   Date Value   10/09/2017 11     CREATININE (mg/dL)   Date Value   10/09/2017 0.51 (L)     Glucose (mg/dL)   Date Value   10/09/2017 127 (H)            Have you changed or started any medications since your last visit including any over-the-counter medicines, vitamins, or herbal medicines? no   Are you having any side effects from any of your medications? -  no  Have you stopped taking any of your medications? Is so, why? -  no    Have you seen any other physician or provider since your last visit? Yes - Records Obtained  Have you had any other diagnostic tests since your last visit? Yes - Records Obtained  Have you been seen in the emergency room and/or had an admission to a hospital since we last saw you? No  Have you had your annual diabetic retinal (eye) exam? No  Have you had your routine dental cleaning in the past 6 months? no    Have you activated your [email or web address] account? If not, what are your barriers?  No:      Patient Care Team:  Rajan Flores MD as PCP - General (Internal Medicine)         Medical History Review  Past Medical, Family, and Social History reviewed and does contribute to the patient presenting condition  Chief Complaint   Patient presents with    Diabetes     a1c 6.8% no meds     Hypertension     he did not start the lisinopril      Health Maintenance   Topic Date Due    DTaP/Tdap/Td vaccine (1 - Tdap) 09/20/1957    Shingles Vaccine (1 of 2 - 2 Dose Series) 09/20/1988    Flu vaccine (1) 09/01/2018    Potassium monitoring  10/09/2018    Creatinine monitoring  10/09/2018    Pneumococcal low/med risk (2 of 2 - PPSV23) 10/05/2018    TSH testing  02/23/2019     HPI- Patient is here for evaluation of multiple medical problems including HTN, Hypothyroidism, DM . Patient is doing better , he has no complains , he has raised BP , was started on lisinopril , which patient did not take his lisinopril . Taking his synthroid , diet controlled DM     Review of Systems   Constitutional: Negative for activity change, appetite change, chills and diaphoresis. HENT: Negative for congestion, dental problem, ear discharge, facial swelling and hearing loss. Respiratory: Negative for apnea, cough, chest tightness, shortness of breath and wheezing. Cardiovascular: Negative for chest pain and leg swelling. Gastrointestinal: Negative for abdominal distention, abdominal pain, constipation and diarrhea. Genitourinary: Negative for difficulty urinating, dysuria, enuresis, flank pain and frequency. Musculoskeletal: Positive for gait problem (uses cane ). Negative for arthralgias, back pain and joint swelling. Skin: Negative for color change, pallor and rash. Neurological: Negative for dizziness, seizures, facial asymmetry, light-headedness, numbness and headaches. Psychiatric/Behavioral: Negative for agitation, behavioral problems, confusion, decreased concentration and dysphoric mood. Objective:   Physical Exam   Constitutional: He is oriented to person, place, and time. He appears well-developed and well-nourished. No distress. HENT:   Head: Normocephalic and atraumatic. Mouth/Throat: No oropharyngeal exudate. Eyes: Pupils are equal, round, and reactive to light. Conjunctivae are normal. Right eye exhibits no discharge. Left eye exhibits no discharge. No scleral icterus. Neck: Normal range of motion. Neck supple. No JVD present. No tracheal deviation present. No thyromegaly present. Cardiovascular: Normal rate and normal heart sounds. Exam reveals no gallop.     No murmur

## 2018-10-01 ENCOUNTER — HOSPITAL ENCOUNTER (OUTPATIENT)
Age: 78
Setting detail: SPECIMEN
Discharge: HOME | End: 2018-10-01
Payer: MEDICARE

## 2018-10-01 DIAGNOSIS — E11.9 TYPE 2 DIABETES MELLITUS WITHOUT COMPLICATION, WITHOUT LONG-TERM CURRENT USE OF INSULIN (HCC): ICD-10-CM

## 2018-10-01 DIAGNOSIS — I10 ESSENTIAL HYPERTENSION: ICD-10-CM

## 2018-10-01 DIAGNOSIS — E03.9 HYPOTHYROIDISM, UNSPECIFIED TYPE: ICD-10-CM

## 2018-10-01 LAB
ESTIMATED AVERAGE GLUCOSE: 140 MG/DL
HBA1C MFR BLD: 6.5 % (ref 4–6)
TSH SERPL DL<=0.05 MIU/L-ACNC: 2.77 MIU/L (ref 0.3–5)

## 2018-10-12 ENCOUNTER — NURSE ONLY (OUTPATIENT)
Dept: INTERNAL MEDICINE CLINIC | Age: 78
End: 2018-10-12

## 2018-10-12 VITALS — DIASTOLIC BLOOD PRESSURE: 76 MMHG | SYSTOLIC BLOOD PRESSURE: 132 MMHG

## 2018-10-12 DIAGNOSIS — Z01.30 BLOOD PRESSURE CHECK: Primary | ICD-10-CM

## 2018-12-17 ENCOUNTER — TELEPHONE (OUTPATIENT)
Dept: ORTHOPEDIC SURGERY | Age: 78
End: 2018-12-17

## 2019-01-10 ENCOUNTER — HOSPITAL ENCOUNTER (OUTPATIENT)
Age: 79
Setting detail: SPECIMEN
Discharge: HOME | End: 2019-01-10
Payer: MEDICARE

## 2019-01-10 ENCOUNTER — OFFICE VISIT (OUTPATIENT)
Dept: INTERNAL MEDICINE CLINIC | Age: 79
End: 2019-01-10
Payer: MEDICARE

## 2019-01-10 VITALS
HEIGHT: 69 IN | RESPIRATION RATE: 20 BRPM | BODY MASS INDEX: 28.29 KG/M2 | TEMPERATURE: 97.3 F | SYSTOLIC BLOOD PRESSURE: 122 MMHG | OXYGEN SATURATION: 97 % | DIASTOLIC BLOOD PRESSURE: 80 MMHG | HEART RATE: 82 BPM | WEIGHT: 191 LBS

## 2019-01-10 DIAGNOSIS — M54.40 CHRONIC MIDLINE LOW BACK PAIN WITH SCIATICA, SCIATICA LATERALITY UNSPECIFIED: Primary | ICD-10-CM

## 2019-01-10 DIAGNOSIS — E03.9 HYPOTHYROIDISM, UNSPECIFIED TYPE: ICD-10-CM

## 2019-01-10 DIAGNOSIS — G89.29 CHRONIC MIDLINE LOW BACK PAIN WITH SCIATICA, SCIATICA LATERALITY UNSPECIFIED: Primary | ICD-10-CM

## 2019-01-10 DIAGNOSIS — E11.9 TYPE 2 DIABETES MELLITUS WITHOUT COMPLICATION, WITHOUT LONG-TERM CURRENT USE OF INSULIN (HCC): ICD-10-CM

## 2019-01-10 DIAGNOSIS — I10 ESSENTIAL HYPERTENSION: ICD-10-CM

## 2019-01-10 DIAGNOSIS — F41.1 GAD (GENERALIZED ANXIETY DISORDER): ICD-10-CM

## 2019-01-10 LAB
ALBUMIN SERPL-MCNC: 4.2 G/DL (ref 3.5–5.2)
ALBUMIN/GLOBULIN RATIO: 1.4 (ref 1–2.5)
ALP BLD-CCNC: 109 U/L (ref 40–129)
ALT SERPL-CCNC: 33 U/L (ref 5–41)
ANION GAP SERPL CALCULATED.3IONS-SCNC: 17 MMOL/L (ref 9–17)
AST SERPL-CCNC: 30 U/L
BILIRUB SERPL-MCNC: 0.62 MG/DL (ref 0.3–1.2)
BUN BLDV-MCNC: 12 MG/DL (ref 8–23)
BUN/CREAT BLD: ABNORMAL (ref 9–20)
CALCIUM SERPL-MCNC: 10.1 MG/DL (ref 8.6–10.4)
CHLORIDE BLD-SCNC: 102 MMOL/L (ref 98–107)
CO2: 25 MMOL/L (ref 20–31)
CREAT SERPL-MCNC: 0.62 MG/DL (ref 0.7–1.2)
ESTIMATED AVERAGE GLUCOSE: 137 MG/DL
GFR AFRICAN AMERICAN: >60 ML/MIN
GFR NON-AFRICAN AMERICAN: >60 ML/MIN
GFR SERPL CREATININE-BSD FRML MDRD: ABNORMAL ML/MIN/{1.73_M2}
GFR SERPL CREATININE-BSD FRML MDRD: ABNORMAL ML/MIN/{1.73_M2}
GLUCOSE BLD-MCNC: 139 MG/DL (ref 70–99)
HBA1C MFR BLD: 6.4 % (ref 4–6)
HCT VFR BLD CALC: 42.9 % (ref 40.7–50.3)
HEMOGLOBIN: 15.1 G/DL (ref 13–17)
MCH RBC QN AUTO: 31.7 PG (ref 25.2–33.5)
MCHC RBC AUTO-ENTMCNC: 35.2 G/DL (ref 28.4–34.8)
MCV RBC AUTO: 89.9 FL (ref 82.6–102.9)
NRBC AUTOMATED: 0 PER 100 WBC
PDW BLD-RTO: 12.6 % (ref 11.8–14.4)
PLATELET # BLD: 249 K/UL (ref 138–453)
PMV BLD AUTO: 10 FL (ref 8.1–13.5)
POTASSIUM SERPL-SCNC: 4.6 MMOL/L (ref 3.7–5.3)
RBC # BLD: 4.77 M/UL (ref 4.21–5.77)
SODIUM BLD-SCNC: 144 MMOL/L (ref 135–144)
TOTAL PROTEIN: 7.2 G/DL (ref 6.4–8.3)
TSH SERPL DL<=0.05 MIU/L-ACNC: 1.37 MIU/L (ref 0.3–5)
WBC # BLD: 7.8 K/UL (ref 3.5–11.3)

## 2019-01-10 PROCEDURE — 99214 OFFICE O/P EST MOD 30 MIN: CPT | Performed by: INTERNAL MEDICINE

## 2019-01-10 RX ORDER — ALPRAZOLAM 0.25 MG/1
0.25 TABLET ORAL PRN
Qty: 30 TABLET | Refills: 0 | Status: SHIPPED | OUTPATIENT
Start: 2019-01-10 | End: 2019-02-09

## 2019-01-10 ASSESSMENT — ENCOUNTER SYMPTOMS
DIARRHEA: 0
CONSTIPATION: 0
BACK PAIN: 1
ABDOMINAL PAIN: 0
CHEST TIGHTNESS: 0
APNEA: 0
FACIAL SWELLING: 0
COLOR CHANGE: 0
COUGH: 0
WHEEZING: 0
ABDOMINAL DISTENTION: 0
SHORTNESS OF BREATH: 0

## 2019-02-21 ENCOUNTER — OFFICE VISIT (OUTPATIENT)
Dept: ORTHOPEDIC SURGERY | Age: 79
End: 2019-02-21
Payer: MEDICARE

## 2019-02-21 DIAGNOSIS — M43.10 ACQUIRED SPONDYLOLISTHESIS: ICD-10-CM

## 2019-02-21 DIAGNOSIS — M54.40 CHRONIC MIDLINE LOW BACK PAIN WITH SCIATICA, SCIATICA LATERALITY UNSPECIFIED: Primary | ICD-10-CM

## 2019-02-21 DIAGNOSIS — M51.36 LUMBAR DEGENERATIVE DISC DISEASE: ICD-10-CM

## 2019-02-21 DIAGNOSIS — G89.29 CHRONIC MIDLINE LOW BACK PAIN WITH SCIATICA, SCIATICA LATERALITY UNSPECIFIED: Primary | ICD-10-CM

## 2019-02-21 DIAGNOSIS — Z96.642 HISTORY OF LEFT HIP REPLACEMENT: ICD-10-CM

## 2019-02-21 DIAGNOSIS — M41.20 SCOLIOSIS (AND KYPHOSCOLIOSIS), IDIOPATHIC: ICD-10-CM

## 2019-02-21 DIAGNOSIS — M16.11 ARTHRITIS OF RIGHT HIP: ICD-10-CM

## 2019-02-21 PROCEDURE — 99203 OFFICE O/P NEW LOW 30 MIN: CPT | Performed by: ORTHOPAEDIC SURGERY

## 2019-02-22 ENCOUNTER — TELEPHONE (OUTPATIENT)
Dept: INTERNAL MEDICINE CLINIC | Age: 79
End: 2019-02-22

## 2019-02-22 DIAGNOSIS — I10 ESSENTIAL HYPERTENSION: Primary | ICD-10-CM

## 2019-02-22 RX ORDER — LOSARTAN POTASSIUM 25 MG/1
25 TABLET ORAL DAILY
Qty: 90 TABLET | Refills: 1 | Status: SHIPPED | OUTPATIENT
Start: 2019-02-22 | End: 2019-05-31 | Stop reason: SDUPTHER

## 2019-02-24 PROBLEM — M16.11 ARTHRITIS OF RIGHT HIP: Status: ACTIVE | Noted: 2019-02-24

## 2019-02-24 PROBLEM — M51.36 LUMBAR DEGENERATIVE DISC DISEASE: Status: ACTIVE | Noted: 2019-02-24

## 2019-02-24 PROBLEM — M43.10 ACQUIRED SPONDYLOLISTHESIS: Status: ACTIVE | Noted: 2019-02-24

## 2019-02-24 PROBLEM — M41.20 SCOLIOSIS (AND KYPHOSCOLIOSIS), IDIOPATHIC: Status: ACTIVE | Noted: 2019-02-24

## 2019-02-24 PROBLEM — Z96.642 HISTORY OF LEFT HIP REPLACEMENT: Status: ACTIVE | Noted: 2019-02-24

## 2019-02-24 ASSESSMENT — ENCOUNTER SYMPTOMS: BACK PAIN: 1

## 2019-02-25 ENCOUNTER — OFFICE VISIT (OUTPATIENT)
Dept: PODIATRY | Age: 79
End: 2019-02-25
Payer: MEDICARE

## 2019-02-25 VITALS — WEIGHT: 191 LBS | BODY MASS INDEX: 28.29 KG/M2 | HEIGHT: 69 IN

## 2019-02-25 DIAGNOSIS — E11.51 TYPE 2 DIABETES MELLITUS WITH PERIPHERAL VASCULAR DISEASE (HCC): ICD-10-CM

## 2019-02-25 DIAGNOSIS — M79.675 PAIN OF TOES OF BOTH FEET: ICD-10-CM

## 2019-02-25 DIAGNOSIS — M79.674 PAIN OF TOES OF BOTH FEET: ICD-10-CM

## 2019-02-25 DIAGNOSIS — B35.1 ONYCHOMYCOSIS OF TOENAIL: Primary | ICD-10-CM

## 2019-02-25 DIAGNOSIS — E11.42 DIABETIC POLYNEUROPATHY ASSOCIATED WITH TYPE 2 DIABETES MELLITUS (HCC): ICD-10-CM

## 2019-02-25 PROCEDURE — 99999 PR OFFICE/OUTPT VISIT,PROCEDURE ONLY: CPT | Performed by: PODIATRIST

## 2019-02-25 PROCEDURE — 11721 DEBRIDE NAIL 6 OR MORE: CPT | Performed by: PODIATRIST

## 2019-02-25 RX ORDER — BLOOD SUGAR DIAGNOSTIC
STRIP MISCELLANEOUS
COMMUNITY
Start: 2019-01-15 | End: 2019-06-11

## 2019-02-25 ASSESSMENT — ENCOUNTER SYMPTOMS
DIARRHEA: 0
BACK PAIN: 0
SHORTNESS OF BREATH: 0
NAUSEA: 0
COLOR CHANGE: 0

## 2019-03-07 ENCOUNTER — HOSPITAL ENCOUNTER (OUTPATIENT)
Dept: PAIN MANAGEMENT | Age: 79
Discharge: HOME | End: 2019-03-07
Payer: MEDICARE

## 2019-03-07 VITALS
RESPIRATION RATE: 20 BRPM | HEART RATE: 88 BPM | HEIGHT: 69 IN | WEIGHT: 191 LBS | DIASTOLIC BLOOD PRESSURE: 76 MMHG | SYSTOLIC BLOOD PRESSURE: 146 MMHG | OXYGEN SATURATION: 98 % | BODY MASS INDEX: 28.29 KG/M2 | TEMPERATURE: 98.6 F

## 2019-03-07 DIAGNOSIS — M41.20 SCOLIOSIS (AND KYPHOSCOLIOSIS), IDIOPATHIC: ICD-10-CM

## 2019-03-07 DIAGNOSIS — M54.16 LUMBAR RADICULOPATHY: ICD-10-CM

## 2019-03-07 DIAGNOSIS — G62.9 PERIPHERAL POLYNEUROPATHY: ICD-10-CM

## 2019-03-07 DIAGNOSIS — M51.36 LUMBAR DEGENERATIVE DISC DISEASE: Primary | ICD-10-CM

## 2019-03-07 DIAGNOSIS — M43.10 ACQUIRED SPONDYLOLISTHESIS: ICD-10-CM

## 2019-03-07 DIAGNOSIS — E11.9 TYPE 2 DIABETES MELLITUS WITHOUT COMPLICATION, WITHOUT LONG-TERM CURRENT USE OF INSULIN (HCC): ICD-10-CM

## 2019-03-07 PROCEDURE — 99204 OFFICE O/P NEW MOD 45 MIN: CPT | Performed by: PAIN MEDICINE

## 2019-03-07 PROCEDURE — 99203 OFFICE O/P NEW LOW 30 MIN: CPT

## 2019-03-07 RX ORDER — ALPRAZOLAM 0.25 MG/1
0.25 TABLET ORAL NIGHTLY PRN
COMMUNITY
End: 2019-08-19 | Stop reason: SDUPTHER

## 2019-03-07 ASSESSMENT — PAIN DESCRIPTION - ONSET: ONSET: ON-GOING

## 2019-03-07 ASSESSMENT — PAIN DESCRIPTION - FREQUENCY: FREQUENCY: CONTINUOUS

## 2019-03-07 ASSESSMENT — PAIN DESCRIPTION - ORIENTATION: ORIENTATION: RIGHT;LEFT

## 2019-03-07 ASSESSMENT — PAIN DESCRIPTION - LOCATION: LOCATION: BACK;LEG

## 2019-03-07 ASSESSMENT — PAIN DESCRIPTION - PAIN TYPE: TYPE: CHRONIC PAIN

## 2019-03-07 ASSESSMENT — PAIN - FUNCTIONAL ASSESSMENT: PAIN_FUNCTIONAL_ASSESSMENT: PREVENTS OR INTERFERES SOME ACTIVE ACTIVITIES AND ADLS

## 2019-03-07 ASSESSMENT — PAIN DESCRIPTION - DESCRIPTORS: DESCRIPTORS: ACHING;SHARP;RADIATING

## 2019-03-07 ASSESSMENT — PAIN SCALES - GENERAL: PAINLEVEL_OUTOF10: 6

## 2019-03-07 ASSESSMENT — PAIN DESCRIPTION - PROGRESSION: CLINICAL_PROGRESSION: GRADUALLY WORSENING

## 2019-03-07 ASSESSMENT — PAIN DESCRIPTION - DIRECTION: RADIATING_TOWARDS: BIL LEGS

## 2019-03-13 DIAGNOSIS — E03.9 HYPOTHYROIDISM, UNSPECIFIED TYPE: ICD-10-CM

## 2019-03-13 RX ORDER — LEVOTHYROXINE SODIUM 0.12 MG/1
125 TABLET ORAL DAILY
Qty: 90 TABLET | Refills: 3 | Status: SHIPPED | OUTPATIENT
Start: 2019-03-13 | End: 2020-05-18

## 2019-03-25 ENCOUNTER — TELEPHONE (OUTPATIENT)
Dept: INTERNAL MEDICINE CLINIC | Age: 79
End: 2019-03-25

## 2019-03-25 ENCOUNTER — APPOINTMENT (OUTPATIENT)
Dept: GENERAL RADIOLOGY | Age: 79
End: 2019-03-25
Payer: MEDICARE

## 2019-03-25 ENCOUNTER — HOSPITAL ENCOUNTER (OUTPATIENT)
Dept: GENERAL RADIOLOGY | Age: 79
Discharge: HOME | End: 2019-03-27
Payer: MEDICARE

## 2019-03-25 ENCOUNTER — HOSPITAL ENCOUNTER (OUTPATIENT)
Dept: PAIN MANAGEMENT | Age: 79
Discharge: HOME | End: 2019-03-25
Payer: MEDICARE

## 2019-03-25 ENCOUNTER — HOSPITAL ENCOUNTER (EMERGENCY)
Age: 79
Discharge: HOME | End: 2019-03-25
Attending: EMERGENCY MEDICINE
Payer: MEDICARE

## 2019-03-25 VITALS
SYSTOLIC BLOOD PRESSURE: 153 MMHG | WEIGHT: 191 LBS | OXYGEN SATURATION: 98 % | BODY MASS INDEX: 28.21 KG/M2 | HEART RATE: 69 BPM | TEMPERATURE: 97.2 F | RESPIRATION RATE: 16 BRPM | DIASTOLIC BLOOD PRESSURE: 77 MMHG

## 2019-03-25 VITALS
WEIGHT: 191 LBS | OXYGEN SATURATION: 98 % | HEART RATE: 80 BPM | RESPIRATION RATE: 12 BRPM | TEMPERATURE: 98.1 F | SYSTOLIC BLOOD PRESSURE: 150 MMHG | BODY MASS INDEX: 28.29 KG/M2 | HEIGHT: 69 IN | DIASTOLIC BLOOD PRESSURE: 83 MMHG

## 2019-03-25 DIAGNOSIS — M43.10 ACQUIRED SPONDYLOLISTHESIS: ICD-10-CM

## 2019-03-25 DIAGNOSIS — I49.3 ASYMPTOMATIC PVCS: Primary | ICD-10-CM

## 2019-03-25 DIAGNOSIS — M54.16 LUMBAR RADICULOPATHY: ICD-10-CM

## 2019-03-25 DIAGNOSIS — M51.36 LUMBAR DEGENERATIVE DISC DISEASE: ICD-10-CM

## 2019-03-25 DIAGNOSIS — M54.16 LUMBAR RADICULOPATHY: Primary | ICD-10-CM

## 2019-03-25 DIAGNOSIS — M41.20 SCOLIOSIS (AND KYPHOSCOLIOSIS), IDIOPATHIC: ICD-10-CM

## 2019-03-25 LAB
ALBUMIN SERPL-MCNC: 4.2 G/DL (ref 3.5–5.2)
ALBUMIN/GLOBULIN RATIO: ABNORMAL (ref 1–2.5)
ALP BLD-CCNC: 95 U/L (ref 40–129)
ALT SERPL-CCNC: 27 U/L (ref 5–41)
ANION GAP SERPL CALCULATED.3IONS-SCNC: 13 MMOL/L (ref 9–17)
AST SERPL-CCNC: 23 U/L
BILIRUB SERPL-MCNC: 0.78 MG/DL (ref 0.3–1.2)
BUN BLDV-MCNC: 15 MG/DL (ref 8–23)
BUN/CREAT BLD: ABNORMAL (ref 9–20)
CALCIUM SERPL-MCNC: 9.5 MG/DL (ref 8.6–10.4)
CHLORIDE BLD-SCNC: 102 MMOL/L (ref 98–107)
CO2: 24 MMOL/L (ref 20–31)
CREAT SERPL-MCNC: 0.52 MG/DL (ref 0.7–1.2)
D-DIMER QUANTITATIVE: 0.6 MG/L FEU (ref 0–0.59)
GFR AFRICAN AMERICAN: >60 ML/MIN
GFR NON-AFRICAN AMERICAN: >60 ML/MIN
GFR SERPL CREATININE-BSD FRML MDRD: ABNORMAL ML/MIN/{1.73_M2}
GFR SERPL CREATININE-BSD FRML MDRD: ABNORMAL ML/MIN/{1.73_M2}
GLUCOSE BLD-MCNC: 164 MG/DL (ref 70–99)
HCT VFR BLD CALC: 40.4 % (ref 41–53)
HEMOGLOBIN: 14.4 G/DL (ref 13.5–17.5)
MCH RBC QN AUTO: 32 PG (ref 26–34)
MCHC RBC AUTO-ENTMCNC: 35.5 G/DL (ref 31–37)
MCV RBC AUTO: 90.2 FL (ref 80–100)
NRBC AUTOMATED: ABNORMAL PER 100 WBC
PDW BLD-RTO: 13.4 % (ref 11.5–14.9)
PLATELET # BLD: 249 K/UL (ref 150–450)
PMV BLD AUTO: 7.5 FL (ref 6–12)
POTASSIUM SERPL-SCNC: 4.3 MMOL/L (ref 3.7–5.3)
RBC # BLD: 4.48 M/UL (ref 4.5–5.9)
SODIUM BLD-SCNC: 139 MMOL/L (ref 135–144)
TOTAL PROTEIN: 6.9 G/DL (ref 6.4–8.3)
TROPONIN INTERP: NORMAL
TROPONIN T: NORMAL NG/ML
TROPONIN, HIGH SENSITIVITY: 12 NG/L (ref 0–22)
WBC # BLD: 7.4 K/UL (ref 3.5–11)

## 2019-03-25 PROCEDURE — 85379 FIBRIN DEGRADATION QUANT: CPT

## 2019-03-25 PROCEDURE — 99284 EMERGENCY DEPT VISIT MOD MDM: CPT

## 2019-03-25 PROCEDURE — 6360000004 HC RX CONTRAST MEDICATION: Performed by: PAIN MEDICINE

## 2019-03-25 PROCEDURE — 84484 ASSAY OF TROPONIN QUANT: CPT

## 2019-03-25 PROCEDURE — 3209999900 FLUORO FOR SURGICAL PROCEDURES

## 2019-03-25 PROCEDURE — 6360000002 HC RX W HCPCS: Performed by: PAIN MEDICINE

## 2019-03-25 PROCEDURE — 71045 X-RAY EXAM CHEST 1 VIEW: CPT

## 2019-03-25 PROCEDURE — 93005 ELECTROCARDIOGRAM TRACING: CPT

## 2019-03-25 PROCEDURE — 85027 COMPLETE CBC AUTOMATED: CPT

## 2019-03-25 PROCEDURE — 80053 COMPREHEN METABOLIC PANEL: CPT

## 2019-03-25 PROCEDURE — 36415 COLL VENOUS BLD VENIPUNCTURE: CPT

## 2019-03-25 PROCEDURE — 62323 NJX INTERLAMINAR LMBR/SAC: CPT | Performed by: PAIN MEDICINE

## 2019-03-25 PROCEDURE — 62327 NJX INTERLAMINAR LMBR/SAC: CPT

## 2019-03-25 RX ORDER — TRIAMCINOLONE ACETONIDE 40 MG/ML
INJECTION, SUSPENSION INTRA-ARTICULAR; INTRAMUSCULAR
Status: COMPLETED | OUTPATIENT
Start: 2019-03-25 | End: 2019-03-25

## 2019-03-25 RX ADMIN — TRIAMCINOLONE ACETONIDE 80 MG: 40 INJECTION, SUSPENSION INTRA-ARTICULAR; INTRAMUSCULAR at 11:03

## 2019-03-25 RX ADMIN — IOHEXOL 2 ML: 180 INJECTION INTRAVENOUS at 11:02

## 2019-03-25 ASSESSMENT — PAIN DESCRIPTION - FREQUENCY: FREQUENCY: CONTINUOUS

## 2019-03-25 ASSESSMENT — PAIN SCALES - GENERAL: PAINLEVEL_OUTOF10: 6

## 2019-03-25 ASSESSMENT — PAIN DESCRIPTION - ORIENTATION: ORIENTATION: RIGHT;LEFT;LOWER

## 2019-03-25 ASSESSMENT — PAIN DESCRIPTION - ONSET: ONSET: ON-GOING

## 2019-03-25 ASSESSMENT — PAIN DESCRIPTION - DESCRIPTORS: DESCRIPTORS: ACHING;SHARP;RADIATING

## 2019-03-25 ASSESSMENT — PAIN DESCRIPTION - PROGRESSION: CLINICAL_PROGRESSION: GRADUALLY WORSENING

## 2019-03-25 ASSESSMENT — PAIN - FUNCTIONAL ASSESSMENT
PAIN_FUNCTIONAL_ASSESSMENT: PREVENTS OR INTERFERES SOME ACTIVE ACTIVITIES AND ADLS
PAIN_FUNCTIONAL_ASSESSMENT: 0-10

## 2019-03-25 ASSESSMENT — PAIN DESCRIPTION - DIRECTION: RADIATING_TOWARDS: BILAT LEGS

## 2019-03-25 ASSESSMENT — PAIN DESCRIPTION - LOCATION: LOCATION: BACK

## 2019-03-25 ASSESSMENT — ENCOUNTER SYMPTOMS
ABDOMINAL PAIN: 0
BACK PAIN: 0
NAUSEA: 0
VOMITING: 0
SHORTNESS OF BREATH: 0

## 2019-03-25 ASSESSMENT — PAIN DESCRIPTION - PAIN TYPE: TYPE: CHRONIC PAIN

## 2019-03-25 NOTE — ED PROVIDER NOTES
[address] ED  eMERGENCY dEPARTMENT eNCOUnter    Pt Name: Souleymane Coto  MRN: [id number]  [place] 1940  Date of evaluation: 3/25/19  CHIEF COMPLAINT       Chief Complaint   Patient presents with    Atrial Fibrillation     HISTORY OF PRESENT ILLNESS   HPI  [de-identified] y.o. male with history of diabetes, hypertension, arthritis, presents due to in a regular rhythm. Patient was getting evaluated for chronic back pain and clinic this morning when he was sent here due to being found to be in an irregular rhythm. Patient is otherwise asymptomatic. He does endorse some mild chest pressure that has been going on for \"months\", nonexertional, no clear trigger, not worsening recently. He denies any shortness of breath, dizziness, diaphoresis, shortness of breath, pleurisy, leg pain, leg swelling. He would not have come to the ED if he had not been told to come here due to the irregular rhythm. REVIEW OF SYSTEMS     Review of Systems   Constitutional: Negative for fever. HENT: Negative for congestion. Eyes: Negative for visual disturbance. Respiratory: Negative for shortness of breath. Cardiovascular: Negative for chest pain. Gastrointestinal: Negative for abdominal pain, nausea and vomiting. Musculoskeletal: Negative for back pain. Skin: Negative for rash. Neurological: Negative for light-headedness and numbness. Psychiatric/Behavioral: Negative for agitation.      PASTMEDICAL HISTORY     Past Medical History:   Diagnosis Date    Arthritis     Diabetes mellitus ([address]     Hypertension      SURGICAL HISTORY       Past Surgical History:   Procedure Laterality Date    BACK SURGERY      Patient states he isnt sure if he had back surgery    JOINT REPLACEMENT      Right hip replacement    LUMBAR EPIDURAL STEROID INJECTION/CAUDAL  3/25/2019          CURRENT MEDICATIONS       Discharge Medication List as of 3/25/2019  1:30 PM      CONTINUE these medications which have NOT CHANGED    Details distress. HENT:   Head: Normocephalic and atraumatic. Neck: Normal range of motion. Neck supple. Cardiovascular: Normal rate. An irregular rhythm present. Pulmonary/Chest: Effort normal. No respiratory distress. Abdominal: He exhibits no distension. Neurological: He is alert and oriented to person, place, and time. Skin: Skin is warm and dry. He is not diaphoretic. Nursing note and vitals reviewed. MEDICAL DECISION MAKING:   Patient presents for evaluation of an possibly irregular rhythm. On exam he is asymptomatic, very well-appearing, with unremarkable vital signs, with a controlled rate. He does have an irregular rhythm on auscultation and on the monitor, but EKG shows normal sinus rhythm with first degree AV block, with some PVCs, but no other arrhythmia. Workup will be done to rule out any acute metabolic abnormalities or anemia or UTI that could have contributed to his PVCs, but otherwise he appears asymptomatic and well-appearing and doesn't seem to need any further emergent evaluation for this. ED Course as of Mar 25 2019   Mon Mar 25, 2019   1310 Unremarkable CMP   Comprehensive Metabolic Panel w/ Reflex to MG(!):    Glucose 164(!)   BUN 15   Creatinine 0.52(!)   Bun/Cre Ratio NOT REPORTED   Calcium 9.5   Sodium 139   Potassium 4.3   Chloride 102   CO2 24   Anion Gap 13   Alk Phos 95   ALT 27   AST 23   Bilirubin 0.78   Total Protein 6.9   Albumin 4.2   Albumin/Globulin Ratio NOT REPORTED   GFR Non- >60   GFR  >60   GFR Comment        GFR Staging NOT REPORTED [TR]   1310 Troponin negative   Troponin, High Sensitivity: 12 [TR]   1310 Unremarkable CBC   WBC: 7.4 [TR]   1310 D-dimer abnormal, however when using age adjusted cutoff he is well within normal limits. Particularly given that he has no specific symptoms that would be concerning for PE, he doesn't appear to benefit from CTA imaging at this time.    D-Dimer, Quant(!): 0.60 [TR]   1312 No SpO2: 98% 96% 97% 98%   Weight:           The patient was given the following medications while in the emergency department:  No orders of the defined types were placed in this encounter. CONSULTS:  None    FINAL IMPRESSION      1. Asymptomatic PVCs          Multiple diagnoses were considered on his evaluation. Patient is safe for discharge. DISPOSITION/PLAN   DISPOSITION Decision To Discharge 03/25/2019 01:28:53 PM      PATIENT REFERRED TO:  Kory Mayo MD   [address] [place] [address]  [phone]      As previously scheduled    DISCHARGE MEDICATIONS:  Discharge Medication List as of 3/25/2019  1:30 PM        Deepika Freedman MD  Attending Emergency Physician  [employer] voice recognition software used in portions of this document.                     Deepika Freedman MD  03/25/19 2020

## 2019-03-26 ENCOUNTER — TELEPHONE (OUTPATIENT)
Dept: PAIN MANAGEMENT | Age: 79
End: 2019-03-26

## 2019-03-26 ENCOUNTER — TELEPHONE (OUTPATIENT)
Dept: INTERNAL MEDICINE CLINIC | Age: 79
End: 2019-03-26

## 2019-04-04 ENCOUNTER — HOSPITAL ENCOUNTER (OUTPATIENT)
Dept: PAIN MANAGEMENT | Age: 79
Discharge: HOME | End: 2019-04-04
Payer: MEDICARE

## 2019-04-04 VITALS
RESPIRATION RATE: 16 BRPM | HEART RATE: 76 BPM | DIASTOLIC BLOOD PRESSURE: 78 MMHG | OXYGEN SATURATION: 99 % | HEIGHT: 69 IN | WEIGHT: 188 LBS | SYSTOLIC BLOOD PRESSURE: 146 MMHG | BODY MASS INDEX: 27.85 KG/M2 | TEMPERATURE: 97.6 F

## 2019-04-04 DIAGNOSIS — G62.9 PERIPHERAL POLYNEUROPATHY: ICD-10-CM

## 2019-04-04 DIAGNOSIS — M54.16 LUMBAR RADICULOPATHY: Primary | ICD-10-CM

## 2019-04-04 DIAGNOSIS — M41.20 SCOLIOSIS (AND KYPHOSCOLIOSIS), IDIOPATHIC: ICD-10-CM

## 2019-04-04 DIAGNOSIS — M51.36 LUMBAR DEGENERATIVE DISC DISEASE: ICD-10-CM

## 2019-04-04 DIAGNOSIS — E11.9 TYPE 2 DIABETES MELLITUS WITHOUT COMPLICATION, WITHOUT LONG-TERM CURRENT USE OF INSULIN (HCC): ICD-10-CM

## 2019-04-04 DIAGNOSIS — M43.10 ACQUIRED SPONDYLOLISTHESIS: ICD-10-CM

## 2019-04-04 PROCEDURE — 99213 OFFICE O/P EST LOW 20 MIN: CPT

## 2019-04-04 PROCEDURE — 99213 OFFICE O/P EST LOW 20 MIN: CPT | Performed by: PAIN MEDICINE

## 2019-04-04 ASSESSMENT — PAIN DESCRIPTION - PROGRESSION: CLINICAL_PROGRESSION: GRADUALLY IMPROVING

## 2019-04-04 ASSESSMENT — PAIN DESCRIPTION - ONSET: ONSET: ON-GOING

## 2019-04-04 ASSESSMENT — PAIN DESCRIPTION - LOCATION: LOCATION: BACK

## 2019-04-04 ASSESSMENT — PAIN SCALES - GENERAL: PAINLEVEL_OUTOF10: 0

## 2019-04-04 ASSESSMENT — ENCOUNTER SYMPTOMS
BACK PAIN: 1
CONSTIPATION: 1
SHORTNESS OF BREATH: 1
SORE THROAT: 0

## 2019-04-04 ASSESSMENT — PAIN DESCRIPTION - FREQUENCY: FREQUENCY: INTERMITTENT

## 2019-04-04 ASSESSMENT — PAIN - FUNCTIONAL ASSESSMENT: PAIN_FUNCTIONAL_ASSESSMENT: PREVENTS OR INTERFERES SOME ACTIVE ACTIVITIES AND ADLS

## 2019-04-04 ASSESSMENT — PAIN DESCRIPTION - ORIENTATION: ORIENTATION: LOWER

## 2019-04-04 ASSESSMENT — PAIN DESCRIPTION - PAIN TYPE: TYPE: CHRONIC PAIN

## 2019-04-04 ASSESSMENT — PAIN DESCRIPTION - DESCRIPTORS: DESCRIPTORS: ACHING

## 2019-04-04 NOTE — PROGRESS NOTES
[address] [facility]  Patient Pain Assessment  RECHECK - Dr. Ivan Bonilla    Primary Care Physician: Manolo Stringer MD    Chief complaint:   Chief Complaint   Patient presents with    Lower Back Pain   . HISTORY OF PRESENT ILLNESS:  Myra Older is [de-identified] y.o. male with    HPI    RX Monitoring 3/7/2019   Attestation The Prescription Monitoring Report for this patient was reviewed today. Chronic Pain Routine Monitoring No signs of potential drug abuse or diversion identified: otherwise, see note documentation       Current Pain Assessment  Pain Assessment  Pain Assessment: 0-10  Pain Level: 0  Patient's Stated Pain Goal: (decrease pain and increase activity)  Pain Type: Chronic pain  Pain Location: Back  Pain Orientation: Lower  Pain Radiating Towards: lower back to upper back between shoulder blades, bilateral legs worse on right  Pain Descriptors: Aching  Pain Frequency: Intermittent  Pain Onset: On-going  Clinical Progression: Gradually improving  Functional Pain Assessment: Prevents or interferes some active activities and ADLs  Non-Pharmaceutical Pain Intervention(s): Rest                    ADVERSE MEDICATION EFFECTS:   Constipation: not applicable  Bowel Regimen: Na  Diet: common adult  Appetite:  ok  Sedation:  not applicable  Urinary Retention: not applicable    FOCUSED PAINSCALE:  Highest : 7  Lowest :0  Average: Range-varies with activity  When and What  was your last procedure:   3-2019 [facility]   Was your procedure effective:  50-60% imrpoved    ACTIVITY/SOCIAL/EMOTIONAL:  Sleep Pattern: 8 hours per night.  generally restful sleep  Energy Level:  Normal  Currently attending Physical Therapy:  No  Home Exercises: daily housecleaning  Mobility: walks with cane, pain increases with activity   Currently seeing a Psychiatrist or Psychologist:  No  Emotional Issues: depression, denies suicidal thoughts   Mood: appropriate     ABERRANT BEHAVIORS SINCE LAST VISIT:  Have you ever been treated in another Pain Clinic not applicable  Refills for prescriptions appropriate: not applicable  Lost rx/pills:not applicable  Taking more medication than prescribed:  not applicable  Are you receiving PAIN medications from  other doctors: no  Last Urine/Serum Drug Screen :not ordered  Was Serum/UDS as anticipated?  not applicable  Brought pill bottles in :not applicable   Was Pill count appropriate? :not applicable   Are currently pregnant?not applicable  Recent ER visits: Yes, after procedure for evaluation for cardiac rhythme             Past Medical History      Diagnosis Date    Arthritis     Diabetes mellitus ([address]     Hypertension        Surgical History  Past Surgical History:   Procedure Laterality Date    BACK SURGERY      Patient states he isnt sure if he had back surgery    JOINT REPLACEMENT      Right hip replacement    LUMBAR EPIDURAL STEROID INJECTION/CAUDAL  3/25/2019            Medications  Current Outpatient Medications   Medication Sig Dispense Refill    levothyroxine (SYNTHROID) 125 MCG tablet Take 1 tablet by mouth Daily 90 tablet 3    ALPRAZolam (XANAX) 0.25 MG tablet Take 0.25 mg by mouth nightly as needed for Sleep.       losartan (COZAAR) 25 MG tablet Take 1 tablet by mouth daily 90 tablet 1    Omega-3 Fatty Acids (FISH OIL) 1000 MG CAPS Take 3,000 mg by mouth daily      latanoprost (XALATAN) 0.005 % ophthalmic solution Place 1 drop into both eyes daily      Naproxen Sodium (ALEVE PO) Take by mouth      aspirin 325 MG tablet Take 325 mg by mouth daily      Cholecalciferol (VITAMIN D3) 5000 units TABS Take by mouth      vitamin E 1000 units capsule Take 1,000 Units by mouth daily      docusate sodium (COLACE) 100 MG capsule Take 100 mg by mouth 2 times daily      vitamin B-12 (CYANOCOBALAMIN) 1000 MCG tablet Take 1,000 mcg by mouth daily      ACCU-CHEK AZEB PLUS strip       Blood Pressure Monitoring (B-D ASSURE BPM/AUTO ARM CUFF) MISC 1 Device by Does not apply route daily 1 each 0     No current facility-administered medications for this encounter. Allergies  Lisinopril    Family History  family history includes No Known Problems in his brother, father, and mother. Social History  Social History     Socioeconomic History    Marital status: Single     Spouse name: None    Number of children: 0    Years of education: None    Highest education level: None   Occupational History    None   Social Needs    Financial resource strain: None    Food insecurity:     Worry: None     Inability: None    Transportation needs:     Medical: None     Non-medical: None   Tobacco Use    Smoking status: Former Smoker     Packs/day: 1.50     Years: 40.00     Pack years: 60.00     Types: Cigarettes     Last attempt to quit: 1988     Years since quittin.5    Smokeless tobacco: Never Used   Substance and Sexual Activity    Alcohol use: No    Drug use: No    Sexual activity: None   Lifestyle    Physical activity:     Days per week: None     Minutes per session: None    Stress: None   Relationships    Social connections:     Talks on phone: None     Gets together: None     Attends Anabaptism service: None     Active member of club or organization: None     Attends meetings of clubs or organizations: None     Relationship status: None    Intimate partner violence:     Fear of current or ex partner: None     Emotionally abused: None     Physically abused: None     Forced sexual activity: None   Other Topics Concern    None   Social History Narrative    None      reports that he does not use drugs. REVIEW OF SYSTEMS:  Review of Systems   Constitutional: Negative for fever. HENT: Negative for ear pain and sore throat. Respiratory: Positive for shortness of breath. Cardiovascular: Negative for chest pain. Gastrointestinal: Positive for constipation. Endocrine:        Complains of belching   Genitourinary: Negative for difficulty urinating.    Musculoskeletal: Positive for back pain. Neurological: Negative for tremors, seizures and speech difficulty. Hematological: Does not bruise/bleed easily. Psychiatric/Behavioral: Negative for sleep disturbance and suicidal ideas. GENERAL PHYSICAL EXAM:  Vitals: BP (!) 146/78   Pulse 76   Temp 97.6 °F (36.4 °C) (Oral)   Resp 16   Ht 5' 9\" (1.753 m)   Wt 188 lb (85.3 kg)   SpO2 99%   BMI 27.76 kg/m² , Body mass index is 27.76 kg/m². Physical Exam Ortho Exam      Nurses Notes and Vital Signs reviewed.     DATA  Labs:   3/25/2019  1:04 PM - Aliyah Fajardo Incoming Lab Results From [facility]     Component Value Ref Range & Units Status Collected Lab   Glucose 164High   70 - 99 mg/dL Final 03/25/2019 11:35 AM [facility]   BUN 15  8 - 23 mg/dL Final 03/25/2019 11:35 AM [facility]   CREATININE 0.52Low   0.70 - 1.20 mg/dL Final 03/25/2019 11:35 AM [facility]   Bun/Cre Ratio NOT REPORTED  9 - 20 Final 03/25/2019 11:35 AM [facility]   Calcium 9.5  8.6 - 10.4 mg/dL Final 03/25/2019 11:35 AM [facility]   Sodium 139  135 - 144 mmol/L Final 03/25/2019 11:35  [facility]   Potassium 4.3  3.7 - 5.3 mmol/L Final 03/25/2019 11:35 AM [facility]   Chloride 102  98 - 107 mmol/L Final 03/25/2019 11:35 AM [facility]   CO2 24  20 - 31 mmol/L Final 03/25/2019 11:35 AM [facility]   Anion Gap 13  9 - 17 mmol/L Final 03/25/2019 11:35 AM [facility]   Alkaline Phosphatase 95  40 - 129 U/L Final 03/25/2019 11:35 AM [facility]   ALT 27  5 - 41 U/L Final 03/25/2019 11:35 AM [facility]   AST 23  <40 U/L Final [facility]/25/2019 11:35 [facility]   Total Bilirubin 0.78  0.3 - 1.2 mg/dL Final 03/25/2019 11:35 AM [facility]   Total Protein 6.9  6.4 - 8.3 g/dL Final 03/25/2019 11:35 AM [facility]

## 2019-04-11 ENCOUNTER — OFFICE VISIT (OUTPATIENT)
Dept: INTERNAL MEDICINE CLINIC | Age: 79
End: 2019-04-11
Payer: MEDICARE

## 2019-04-11 VITALS
SYSTOLIC BLOOD PRESSURE: 132 MMHG | OXYGEN SATURATION: 92 % | BODY MASS INDEX: 27.55 KG/M2 | HEART RATE: 54 BPM | DIASTOLIC BLOOD PRESSURE: 84 MMHG | WEIGHT: 186 LBS | HEIGHT: 69 IN

## 2019-04-11 DIAGNOSIS — K21.00 GASTROESOPHAGEAL REFLUX DISEASE WITH ESOPHAGITIS: ICD-10-CM

## 2019-04-11 DIAGNOSIS — I10 ESSENTIAL HYPERTENSION: ICD-10-CM

## 2019-04-11 DIAGNOSIS — E11.9 TYPE 2 DIABETES MELLITUS WITHOUT COMPLICATION, WITHOUT LONG-TERM CURRENT USE OF INSULIN (HCC): Primary | ICD-10-CM

## 2019-04-11 DIAGNOSIS — E03.9 HYPOTHYROIDISM, UNSPECIFIED TYPE: ICD-10-CM

## 2019-04-11 PROCEDURE — 99214 OFFICE O/P EST MOD 30 MIN: CPT | Performed by: INTERNAL MEDICINE

## 2019-04-11 RX ORDER — FAMOTIDINE 20 MG/1
20 TABLET, FILM COATED ORAL 2 TIMES DAILY
Qty: 60 TABLET | Refills: 3 | Status: SHIPPED | OUTPATIENT
Start: 2019-04-11 | End: 2019-10-29 | Stop reason: SDUPTHER

## 2019-04-11 ASSESSMENT — ENCOUNTER SYMPTOMS
BLOOD IN STOOL: 0
EYE PAIN: 0
CONSTIPATION: 0
CHEST TIGHTNESS: 0
SHORTNESS OF BREATH: 0
COUGH: 0
CHOKING: 0
EYE DISCHARGE: 0
COLOR CHANGE: 0
ABDOMINAL DISTENTION: 0
EYE REDNESS: 0
ABDOMINAL PAIN: 1
APNEA: 0
DIARRHEA: 0
EYE ITCHING: 0
BACK PAIN: 1

## 2019-04-11 ASSESSMENT — PATIENT HEALTH QUESTIONNAIRE - PHQ9
SUM OF ALL RESPONSES TO PHQ9 QUESTIONS 1 & 2: 0
SUM OF ALL RESPONSES TO PHQ QUESTIONS 1-9: 0
SUM OF ALL RESPONSES TO PHQ QUESTIONS 1-9: 0
2. FEELING DOWN, DEPRESSED OR HOPELESS: 0
1. LITTLE INTEREST OR PLEASURE IN DOING THINGS: 0

## 2019-04-11 NOTE — PATIENT INSTRUCTIONS
Patient Education        Gastroesophageal Reflux Disease (GERD): Care Instructions  Your Care Instructions    Gastroesophageal reflux disease (GERD) is the backward flow of stomach acid into the esophagus. The esophagus is the tube that leads from your throat to your stomach. A one-way valve prevents the stomach acid from moving up into this tube. When you have GERD, this valve does not close tightly enough. If you have mild GERD symptoms including heartburn, you may be able to control the problem with antacids or over-the-counter medicine. Changing your diet, losing weight, and making other lifestyle changes can also help reduce symptoms. Follow-up care is a key part of your treatment and safety. Be sure to make and go to all appointments, and call your doctor if you are having problems. It's also a good idea to know your test results and keep a list of the medicines you take. How can you care for yourself at home? · Take your medicines exactly as prescribed. Call your doctor if you think you are having a problem with your medicine. · Your doctor may recommend over-the-counter medicine. For mild or occasional indigestion, antacids, such as Tums, Gaviscon, Mylanta, or Maalox, may help. Your doctor also may recommend over-the-counter acid reducers, such as Pepcid AC, Tagamet HB, Zantac 75, or Prilosec. Read and follow all instructions on the label. If you use these medicines often, talk with your doctor. · Change your eating habits. ? It's best to eat several small meals instead of two or three large meals. ? After you eat, wait 2 to 3 hours before you lie down. ? Chocolate, mint, and alcohol can make GERD worse. ? Spicy foods, foods that have a lot of acid (like tomatoes and oranges), and coffee can make GERD symptoms worse in some people. If your symptoms are worse after you eat a certain food, you may want to stop eating that food to see if your symptoms get better.   · Do not smoke or chew tobacco. tube that bends. It is called an endoscope, or scope. The doctor puts the tip of the scope in your mouth and gently moves it down your throat. The scope is a flexible video camera. The doctor looks at a monitor (like a TV set or a computer screen) as he or she moves the scope. A doctor may do this test, which is also called a procedure, to look for ulcers, tumors, infection, or bleeding. It also can be used to look for signs of acid backing up into your esophagus. This is called gastroesophageal reflux disease, or GERD. The doctor can use the scope to take a sample of tissue for study (a biopsy). The doctor also can use the scope to take out growths or stop bleeding. Follow-up care is a key part of your treatment and safety. Be sure to make and go to all appointments, and call your doctor if you are having problems. It's also a good idea to know your test results and keep a list of the medicines you take. What happens before the procedure?   Preparing for the procedure    · Understand exactly what procedure is planned, along with the risks, benefits, and other options. · Tell your doctors ALL the medicines, vitamins, supplements, and herbal remedies you take. Some of these can increase the risk of bleeding or interact with anesthesia.     · If you take blood thinners, such as warfarin (Coumadin), clopidogrel (Plavix), or aspirin, be sure to talk to your doctor. He or she will tell you if you should stop taking these medicines before your procedure. Make sure that you understand exactly what your doctor wants you to do.     · Your doctor will tell you which medicines to take or stop before your procedure. You may need to stop taking certain medicines a week or more before the procedure. So talk to your doctor as soon as you can.     · If you have an advance directive, let your doctor know. It may include a living will and a durable power of  for health care.  Bring a copy to the hospital. If you don't for you to drive.     · You will be given more specific instructions about recovering from your procedure. They will cover things like diet, wound care, follow-up care, driving, and getting back to your normal routine. When should you call your doctor? · You have questions or concerns.     · You don't understand how to prepare for your procedure.     · You become ill before the procedure (such as fever, flu, or a cold).     · You need to reschedule or have changed your mind about having the procedure. Where can you learn more? Go to [email or web address] org and sign in to your [employer] account. Enter P790 in the [facility] box to learn more about \"Upper GI Endoscopy: Before Your Procedure. \"     If you do not have an account, please click on the \"Sign Up Now\" link. Current as of: March 27, 2018  Content Version: 11.9  © 7206-0586 [employer]. Care instructions adapted under license by [facility] ([facility]). If you have questions about a medical condition or this instruction, always ask your healthcare professional. [address] any warranty or liability for your use of this information.

## 2019-04-11 NOTE — PROGRESS NOTES
Systems   Constitutional: Negative for activity change, appetite change, chills, diaphoresis, fatigue and fever. HENT: Negative for congestion, dental problem, drooling and ear discharge. Eyes: Negative for pain, discharge, redness and itching. Respiratory: Negative for apnea, cough, choking, chest tightness and shortness of breath. Cardiovascular: Negative for chest pain and leg swelling. Gastrointestinal: Positive for abdominal pain (gas Like Feeling in Chest ). Negative for abdominal distention, blood in stool, constipation and diarrhea. Endocrine: Negative for cold intolerance and heat intolerance. Genitourinary: Negative for difficulty urinating, dysuria, enuresis, flank pain and frequency. Musculoskeletal: Positive for arthralgias (Right knee ), back pain and gait problem (uses cane ). Negative for joint swelling. Skin: Negative for color change, pallor and rash. Neurological: Negative for dizziness, facial asymmetry, light-headedness, numbness and headaches. Psychiatric/Behavioral: Negative for agitation, behavioral problems, confusion, decreased concentration and dysphoric mood. Objective:   Physical Exam   Constitutional: He is oriented to person, place, and time. He appears well-developed and well-nourished. No distress. Uses cane      HENT:   Head: Normocephalic and atraumatic. Mouth/Throat: No oropharyngeal exudate. Eyes: Pupils are equal, round, and reactive to light. Conjunctivae are normal. Right eye exhibits no discharge. Left eye exhibits no discharge. No scleral icterus. Neck: Normal range of motion. Neck supple. No JVD present. No tracheal deviation present. No thyromegaly present. Cardiovascular: Normal rate and normal heart sounds. Exam reveals no gallop. No murmur heard. Pulmonary/Chest: Effort normal and breath sounds normal. No stridor. No respiratory distress. He has no wheezes. He has no rales. Abdominal: Soft.  Bowel sounds are normal. He

## 2019-05-02 ENCOUNTER — OFFICE VISIT (OUTPATIENT)
Dept: ORTHOPEDIC SURGERY | Age: 79
End: 2019-05-02
Payer: MEDICARE

## 2019-05-02 DIAGNOSIS — G89.29 CHRONIC MIDLINE LOW BACK PAIN WITH SCIATICA, SCIATICA LATERALITY UNSPECIFIED: Primary | ICD-10-CM

## 2019-05-02 DIAGNOSIS — M16.11 ARTHRITIS OF RIGHT HIP: ICD-10-CM

## 2019-05-02 DIAGNOSIS — M54.40 CHRONIC MIDLINE LOW BACK PAIN WITH SCIATICA, SCIATICA LATERALITY UNSPECIFIED: Primary | ICD-10-CM

## 2019-05-02 DIAGNOSIS — M41.20 SCOLIOSIS (AND KYPHOSCOLIOSIS), IDIOPATHIC: ICD-10-CM

## 2019-05-02 DIAGNOSIS — M43.10 ACQUIRED SPONDYLOLISTHESIS: ICD-10-CM

## 2019-05-02 DIAGNOSIS — M51.36 LUMBAR DEGENERATIVE DISC DISEASE: ICD-10-CM

## 2019-05-02 PROCEDURE — 99213 OFFICE O/P EST LOW 20 MIN: CPT | Performed by: ORTHOPAEDIC SURGERY

## 2019-05-02 RX ORDER — DIAZEPAM 10 MG/1
TABLET ORAL
Qty: 1 TABLET | Refills: 0 | Status: SHIPPED | OUTPATIENT
Start: 2019-05-02 | End: 2019-05-12

## 2019-05-02 NOTE — PROGRESS NOTES
Subjective:      Patient ID: Isadora West is a [de-identified] y.o. male.     HPI    Review of Systems    Objective:   Physical Exam    Assessment:             Plan:       jovany Prakash MD

## 2019-05-15 LAB
EKG ATRIAL RATE: 66 BPM
EKG P AXIS: 65 DEGREES
EKG P-R INTERVAL: 218 MS
EKG Q-T INTERVAL: 380 MS
EKG QRS DURATION: 90 MS
EKG QTC CALCULATION (BAZETT): 398 MS
EKG R AXIS: -41 DEGREES
EKG T AXIS: 27 DEGREES
EKG VENTRICULAR RATE: 66 BPM

## 2019-05-16 ENCOUNTER — TELEPHONE (OUTPATIENT)
Dept: INTERNAL MEDICINE CLINIC | Age: 79
End: 2019-05-16

## 2019-05-16 DIAGNOSIS — E11.9 TYPE 2 DIABETES MELLITUS WITHOUT COMPLICATION, WITHOUT LONG-TERM CURRENT USE OF INSULIN (HCC): ICD-10-CM

## 2019-05-16 RX ORDER — BLOOD SUGAR DIAGNOSTIC
STRIP MISCELLANEOUS
Qty: 100 STRIP | Refills: 3 | Status: SHIPPED | OUTPATIENT
Start: 2019-05-16 | End: 2020-01-13

## 2019-05-16 NOTE — TELEPHONE ENCOUNTER
Patient called to advise Dr. Marline Mercer he will reschedule his cancelled appointment for 5/17 sometime in June. He also would like him to know he went to see Dr. Tamika Aguilar and an MRI has been approved. Thank you.

## 2019-05-28 ENCOUNTER — HOSPITAL ENCOUNTER (OUTPATIENT)
Dept: MRI IMAGING | Facility: CLINIC | Age: 79
Discharge: HOME | End: 2019-05-30
Payer: MEDICARE

## 2019-05-28 DIAGNOSIS — M54.40 CHRONIC MIDLINE LOW BACK PAIN WITH SCIATICA, SCIATICA LATERALITY UNSPECIFIED: ICD-10-CM

## 2019-05-28 DIAGNOSIS — M43.10 ACQUIRED SPONDYLOLISTHESIS: ICD-10-CM

## 2019-05-28 DIAGNOSIS — G89.29 CHRONIC MIDLINE LOW BACK PAIN WITH SCIATICA, SCIATICA LATERALITY UNSPECIFIED: ICD-10-CM

## 2019-05-28 DIAGNOSIS — M41.20 SCOLIOSIS (AND KYPHOSCOLIOSIS), IDIOPATHIC: ICD-10-CM

## 2019-05-28 DIAGNOSIS — M51.36 LUMBAR DEGENERATIVE DISC DISEASE: ICD-10-CM

## 2019-05-28 PROCEDURE — 72148 MRI LUMBAR SPINE W/O DYE: CPT

## 2019-05-31 DIAGNOSIS — I10 ESSENTIAL HYPERTENSION: ICD-10-CM

## 2019-05-31 RX ORDER — LOSARTAN POTASSIUM 25 MG/1
25 TABLET ORAL DAILY
Qty: 90 TABLET | Refills: 1 | Status: SHIPPED | OUTPATIENT
Start: 2019-05-31 | End: 2020-02-11

## 2019-06-04 ENCOUNTER — OFFICE VISIT (OUTPATIENT)
Dept: PODIATRY | Age: 79
End: 2019-06-04
Payer: MEDICARE

## 2019-06-04 VITALS — WEIGHT: 191 LBS | HEIGHT: 69 IN | BODY MASS INDEX: 28.29 KG/M2

## 2019-06-04 DIAGNOSIS — E11.51 TYPE 2 DIABETES MELLITUS WITH PERIPHERAL VASCULAR DISEASE (HCC): ICD-10-CM

## 2019-06-04 DIAGNOSIS — E11.42 DIABETIC POLYNEUROPATHY ASSOCIATED WITH TYPE 2 DIABETES MELLITUS (HCC): ICD-10-CM

## 2019-06-04 DIAGNOSIS — M79.674 PAIN OF TOES OF BOTH FEET: ICD-10-CM

## 2019-06-04 DIAGNOSIS — M79.675 PAIN OF TOES OF BOTH FEET: ICD-10-CM

## 2019-06-04 DIAGNOSIS — B35.1 ONYCHOMYCOSIS OF TOENAIL: Primary | ICD-10-CM

## 2019-06-04 PROCEDURE — 11721 DEBRIDE NAIL 6 OR MORE: CPT | Performed by: PODIATRIST

## 2019-06-04 PROCEDURE — 99999 PR OFFICE/OUTPT VISIT,PROCEDURE ONLY: CPT | Performed by: PODIATRIST

## 2019-06-05 ASSESSMENT — ENCOUNTER SYMPTOMS
COLOR CHANGE: 0
NAUSEA: 0
DIARRHEA: 0
BACK PAIN: 0
SHORTNESS OF BREATH: 0

## 2019-06-05 NOTE — PROGRESS NOTES
SUBJECTIVE: Anahy Cox is a [de-identified] y.o. male who returns to the office with chief complaint of painful fungal toenails. Patient relates toe nails are thickened/difficult to trim as well as painful with ambulation and with shoe gear. Chief Complaint   Patient presents with    Nail Problem     nail trim/last saw Nolvia Justin 4/11/19    Diabetes     blood sugar 131    Check-Up     diabetic foot check      Review of Systems   Constitutional: Negative for activity change, appetite change, chills, diaphoresis, fatigue and fever. Respiratory: Negative for shortness of breath. Cardiovascular: Negative for leg swelling. Gastrointestinal: Negative for diarrhea and nausea. Endocrine: Negative for cold intolerance, heat intolerance and polyuria. Musculoskeletal: Positive for arthralgias and gait problem. Negative for back pain, joint swelling and myalgias. Skin: Negative for color change, pallor, rash and wound. Allergic/Immunologic: Negative for environmental allergies and food allergies. Neurological: Negative for dizziness, weakness, light-headedness and numbness. Hematological: Does not bruise/bleed easily. Psychiatric/Behavioral: Negative for behavioral problems, confusion and self-injury. The patient is not nervous/anxious. OBJECTIVE: Clinical evaluation of patient reveals nails 1,2,3,4,5 of the right foot and nails 1,2,3,4,5, of the left foot to present with thickness, elongation, discoloration, brittleness, and subungual debris. There was pain with palpation and debridement of the toenails of the bilateral feet. No open lesions noted to either foot today. The right DP pulse is palpable. The left DP pulse is palpable. The right PT pulse is not palpable. The left PT pulse is not palpable. Protective sensation is absent to the right plantar foot as noted with a 5.07 [place]ub[place] monofilament.    Protective sensation is absent to the left plantar foot as noted with a 5.07 [place]ubham monofilament. Glucose: 131 mg/dl. ASSESSMENT:    Diagnosis Orders   1. Onychomycosis of toenail  NV DEBRIDEMENT OF NAILS, 6 OR MORE    HM DIABETES FOOT EXAM   2. Pain of toes of both feet  NV DEBRIDEMENT OF NAILS, 6 OR MORE    HM DIABETES FOOT EXAM   3. Type 2 diabetes mellitus with peripheral vascular disease (HCC)  NV DEBRIDEMENT OF NAILS, 6 OR MORE    HM DIABETES FOOT EXAM   4. Diabetic polyneuropathy associated with type 2 diabetes mellitus (HCC)  NV DEBRIDEMENT OF NAILS, 6 OR MORE    HM DIABETES FOOT EXAM     PLAN: Toenails 1,2,3,4,5 of the right foot and 1,2,3,4,5 of the left foot were debrided in length and thickness using a nail nipper and a . Return in about 2 months (around 8/6/2019) for At risk diabetic foot care.    6/4/2019      Jared Chu DPM

## 2019-06-06 ENCOUNTER — OFFICE VISIT (OUTPATIENT)
Dept: ORTHOPEDIC SURGERY | Age: 79
End: 2019-06-06
Payer: MEDICARE

## 2019-06-06 DIAGNOSIS — G89.29 CHRONIC MIDLINE LOW BACK PAIN WITH SCIATICA, SCIATICA LATERALITY UNSPECIFIED: Primary | ICD-10-CM

## 2019-06-06 DIAGNOSIS — M51.36 LUMBAR DEGENERATIVE DISC DISEASE: ICD-10-CM

## 2019-06-06 DIAGNOSIS — M54.40 CHRONIC MIDLINE LOW BACK PAIN WITH SCIATICA, SCIATICA LATERALITY UNSPECIFIED: Primary | ICD-10-CM

## 2019-06-06 DIAGNOSIS — M43.10 ACQUIRED SPONDYLOLISTHESIS: ICD-10-CM

## 2019-06-06 DIAGNOSIS — M41.20 SCOLIOSIS (AND KYPHOSCOLIOSIS), IDIOPATHIC: ICD-10-CM

## 2019-06-06 PROCEDURE — 99213 OFFICE O/P EST LOW 20 MIN: CPT | Performed by: ORTHOPAEDIC SURGERY

## 2019-06-11 ENCOUNTER — OFFICE VISIT (OUTPATIENT)
Dept: INTERNAL MEDICINE CLINIC | Age: 79
End: 2019-06-11
Payer: MEDICARE

## 2019-06-11 VITALS
HEIGHT: 69 IN | SYSTOLIC BLOOD PRESSURE: 130 MMHG | WEIGHT: 191 LBS | BODY MASS INDEX: 28.29 KG/M2 | OXYGEN SATURATION: 96 % | HEART RATE: 94 BPM | DIASTOLIC BLOOD PRESSURE: 78 MMHG

## 2019-06-11 DIAGNOSIS — K21.00 GASTROESOPHAGEAL REFLUX DISEASE WITH ESOPHAGITIS: ICD-10-CM

## 2019-06-11 DIAGNOSIS — E03.9 HYPOTHYROIDISM, UNSPECIFIED TYPE: Primary | ICD-10-CM

## 2019-06-11 DIAGNOSIS — E11.9 TYPE 2 DIABETES MELLITUS WITHOUT COMPLICATION, WITHOUT LONG-TERM CURRENT USE OF INSULIN (HCC): ICD-10-CM

## 2019-06-11 DIAGNOSIS — I10 ESSENTIAL HYPERTENSION: ICD-10-CM

## 2019-06-11 LAB — HBA1C MFR BLD: 7.1 %

## 2019-06-11 PROCEDURE — 99214 OFFICE O/P EST MOD 30 MIN: CPT | Performed by: INTERNAL MEDICINE

## 2019-06-11 PROCEDURE — 83036 HEMOGLOBIN GLYCOSYLATED A1C: CPT | Performed by: INTERNAL MEDICINE

## 2019-06-11 NOTE — PROGRESS NOTES
Subjective:      Chief Complaint   Patient presents with    Diabetes     Pt has been checking BS daily he states his number has been good    Results     Review labs and MRI      Patient ID: Raquel Casarez is a [de-identified] y.o. male. Visit Information    Have you changed or started any medications since your last visit including any over-the-counter medicines, vitamins, or herbal medicines? no   Are you having any side effects from any of your medications? -  no  Have you stopped taking any of your medications? Is so, why? -  no    Have you seen any other physician or provider since your last visit? Yes - Records Obtained  Have you had any other diagnostic tests since your last visit? No  Have you been seen in the emergency room and/or had an admission to a hospital since we last saw you? No  Have you had your routine dental cleaning in the past 6 months? no    Have you activated your [facility] account? If not, what are your barriers? No:      Patient Care Team:  Charo Arriola MD as PCP - General (Internal Medicine)  Charo Ar[facility] as PCP [facility]    Medical History Review  Past Medical, Family, and Social History reviewed and does not contribute to the patient presenting condition    Health Maintenance   Topic Date Due    Pneumococcal 65+ years Vaccine (2 of 2 - PPSV23) 10/05/2018    DTaP/Tdap/Td vaccine (1 - Tdap) 04/11/2020 (Originally 9/20/1959)    Shingles Vaccine (1 of 2) 04/11/2020 (Originally 9/20/1990)    Flu vaccine (Season Ended) 09/01/2019    TSH testing  01/10/2020    Potassium monitoring  03/25/2020    Creatinine monitoring  03/25/2020     HPI- patient is here for evaluation of multiple medical problems. He has hypertension, diet-controlled diabetes, hypothyroidism. He mentioned that he is compliant with his diet and medication. He checked his blood sugars regularly. He mentioned that his blood sugars are running okay. He has chronic back pain, follows with orthopedic surgeon.

## 2019-06-23 ASSESSMENT — ENCOUNTER SYMPTOMS
APNEA: 0
ABDOMINAL DISTENTION: 0
WHEEZING: 0
BACK PAIN: 1
FACIAL SWELLING: 0
CHEST TIGHTNESS: 0
DIARRHEA: 0
ABDOMINAL PAIN: 0
COLOR CHANGE: 0
SHORTNESS OF BREATH: 0
CONSTIPATION: 0
COUGH: 0

## 2019-06-26 ENCOUNTER — HOSPITAL ENCOUNTER (OUTPATIENT)
Dept: PREADMISSION TESTING | Age: 79
Discharge: HOME | End: 2019-06-30
Payer: MEDICARE

## 2019-06-26 VITALS
TEMPERATURE: 97 F | DIASTOLIC BLOOD PRESSURE: 71 MMHG | HEART RATE: 82 BPM | WEIGHT: 196 LBS | OXYGEN SATURATION: 99 % | SYSTOLIC BLOOD PRESSURE: 142 MMHG | BODY MASS INDEX: 28.93 KG/M2 | RESPIRATION RATE: 24 BRPM

## 2019-06-26 LAB
ABSOLUTE EOS #: 0.1 K/UL (ref 0–0.4)
ABSOLUTE IMMATURE GRANULOCYTE: NORMAL K/UL (ref 0–0.3)
ABSOLUTE LYMPH #: 1.5 K/UL (ref 1–4.8)
ABSOLUTE MONO #: 0.4 K/UL (ref 0.1–1.3)
ANION GAP SERPL CALCULATED.3IONS-SCNC: 13 MMOL/L (ref 9–17)
BASOPHILS # BLD: 1 % (ref 0–2)
BASOPHILS ABSOLUTE: 0.1 K/UL (ref 0–0.2)
BUN BLDV-MCNC: 16 MG/DL (ref 8–23)
BUN/CREAT BLD: ABNORMAL (ref 9–20)
CALCIUM SERPL-MCNC: 9.5 MG/DL (ref 8.6–10.4)
CHLORIDE BLD-SCNC: 102 MMOL/L (ref 98–107)
CO2: 23 MMOL/L (ref 20–31)
CREAT SERPL-MCNC: 0.63 MG/DL (ref 0.7–1.2)
DIFFERENTIAL TYPE: NORMAL
EOSINOPHILS RELATIVE PERCENT: 2 % (ref 0–4)
GFR AFRICAN AMERICAN: >60 ML/MIN
GFR NON-AFRICAN AMERICAN: >60 ML/MIN
GFR SERPL CREATININE-BSD FRML MDRD: ABNORMAL ML/MIN/{1.73_M2}
GFR SERPL CREATININE-BSD FRML MDRD: ABNORMAL ML/MIN/{1.73_M2}
GLUCOSE BLD-MCNC: 242 MG/DL (ref 70–99)
HCT VFR BLD CALC: 41.8 % (ref 41–53)
HEMOGLOBIN: 14.6 G/DL (ref 13.5–17.5)
IMMATURE GRANULOCYTES: NORMAL %
LYMPHOCYTES # BLD: 25 % (ref 24–44)
MCH RBC QN AUTO: 32.2 PG (ref 26–34)
MCHC RBC AUTO-ENTMCNC: 34.9 G/DL (ref 31–37)
MCV RBC AUTO: 92.1 FL (ref 80–100)
MONOCYTES # BLD: 7 % (ref 1–7)
NRBC AUTOMATED: NORMAL PER 100 WBC
PDW BLD-RTO: 13.1 % (ref 11.5–14.9)
PLATELET # BLD: 262 K/UL (ref 150–450)
PLATELET ESTIMATE: NORMAL
PMV BLD AUTO: 7.6 FL (ref 6–12)
POTASSIUM SERPL-SCNC: 4.3 MMOL/L (ref 3.7–5.3)
RBC # BLD: 4.54 M/UL (ref 4.5–5.9)
RBC # BLD: NORMAL 10*6/UL
SEG NEUTROPHILS: 65 % (ref 36–66)
SEGMENTED NEUTROPHILS ABSOLUTE COUNT: 3.9 K/UL (ref 1.3–9.1)
SODIUM BLD-SCNC: 138 MMOL/L (ref 135–144)
WBC # BLD: 6 K/UL (ref 3.5–11)
WBC # BLD: NORMAL 10*3/UL

## 2019-06-26 PROCEDURE — 85025 COMPLETE CBC W/AUTO DIFF WBC: CPT

## 2019-06-26 PROCEDURE — 36415 COLL VENOUS BLD VENIPUNCTURE: CPT

## 2019-06-26 PROCEDURE — 80048 BASIC METABOLIC PNL TOTAL CA: CPT

## 2019-06-26 NOTE — H&P
 Cholecalciferol (VITAMIN D3) 5000 units TABS Take by mouth      vitamin E 1000 units capsule Take 1,000 Units by mouth daily      docusate sodium (COLACE) 100 MG capsule Take 100 mg by mouth 2 times daily      vitamin B-12 (CYANOCOBALAMIN) 1000 MCG tablet Take 1,000 mcg by mouth daily      ACCU-CHEK AZEB PLUS strip USE TO TEST 2 TO 3 TIMES DAILY 100 strip 3    Blood Pressure Monitoring (B-D ASSURE BPM/AUTO ARM CUFF) MISC 1 Device by Does not apply route daily 1 each 0     No current facility-administered medications on file prior to encounter. General health:  Fairly good. No fever or chills. Skin:  No itching, redness or rash. HEENT:  No headache, epistaxis or sore throat. Neck:  No pain, stiffness or masses. Cardiovascular/Respiratory system:  No chest pain, palpitation or shortness of breath. Gastrointestinal tract: No abdominal pain, nausea, vomiting, diarrhea or constipation. Genitourinary:  No burning on micturition. No hesitancy, urgency, frequency or discoloration of urine. Locomotor:  See HPI. Neuropsychiatric:  No referable complaints. GENERAL PHYSICAL EXAM:     Vitals: BP (!) 142/71   Pulse 82   Temp 97 °F (36.1 °C) (Oral)   Resp 24   Wt 196 lb (88.9 kg)   SpO2 99%   BMI 28.93 kg/m²  Body mass index is 28.93 kg/m². GENERAL APPEARANCE:   Ansley Conn is [de-identified] y.o.,  male, moderately obese, nourished, conscious, alert. Does not appear to be distress or pain at this time. SKIN:  Warm, dry, no cyanosis or jaundice. HEAD:  Normocephalic, atraumatic, no swelling or tenderness. EYES:  Pupils equal, reactive to light. EARS:  No discharge, no marked hearing loss. NOSE:  No rhinorrhea, epistaxis or septal deformity. THROAT:  Not congested.

## 2019-06-27 ENCOUNTER — TELEPHONE (OUTPATIENT)
Dept: INTERNAL MEDICINE CLINIC | Age: 79
End: 2019-06-27

## 2019-06-27 RX ORDER — SODIUM CHLORIDE 0.9 % (FLUSH) 0.9 %
10 SYRINGE (ML) INJECTION EVERY 12 HOURS SCHEDULED
Status: CANCELLED | OUTPATIENT
Start: 2019-06-27

## 2019-06-27 RX ORDER — LIDOCAINE HYDROCHLORIDE 10 MG/ML
1 INJECTION, SOLUTION EPIDURAL; INFILTRATION; INTRACAUDAL; PERINEURAL
Status: CANCELLED | OUTPATIENT
Start: 2019-06-27 | End: 2019-06-27

## 2019-06-27 RX ORDER — SODIUM CHLORIDE, SODIUM LACTATE, POTASSIUM CHLORIDE, CALCIUM CHLORIDE 600; 310; 30; 20 MG/100ML; MG/100ML; MG/100ML; MG/100ML
INJECTION, SOLUTION INTRAVENOUS CONTINUOUS
Status: CANCELLED | OUTPATIENT
Start: 2019-06-27

## 2019-06-27 RX ORDER — SODIUM CHLORIDE 0.9 % (FLUSH) 0.9 %
10 SYRINGE (ML) INJECTION PRN
Status: CANCELLED | OUTPATIENT
Start: 2019-06-27

## 2019-06-27 NOTE — TELEPHONE ENCOUNTER
Cleared for surgery , Intermediate Risk   Has Low RCRI  Good METS   Seen Recent Lab work   Seen EKG done in 3/19

## 2019-06-27 NOTE — TELEPHONE ENCOUNTER
Medical surgical clearance request received 06/27/19    Surgeon: Dr. Chelsey Jasso    Procedure: L2 - 4  Decompression    Date of Procedure: 7/10/19    Last appt: 6/11/19    Next appt: Visit date not found    PATs received:    [x] yes   [] no    Documentation placed on Desiree's desk

## 2019-07-05 ENCOUNTER — APPOINTMENT (OUTPATIENT)
Dept: CT IMAGING | Age: 79
DRG: 684 | End: 2019-07-05
Payer: MEDICARE

## 2019-07-05 ENCOUNTER — HOSPITAL ENCOUNTER (EMERGENCY)
Age: 79
Discharge: LEFT BEFORE DISCHARGE | DRG: 684 | End: 2019-07-05
Attending: EMERGENCY MEDICINE | Admitting: INTERNAL MEDICINE
Payer: MEDICARE

## 2019-07-05 VITALS
WEIGHT: 184 LBS | HEART RATE: 77 BPM | RESPIRATION RATE: 19 BRPM | DIASTOLIC BLOOD PRESSURE: 55 MMHG | SYSTOLIC BLOOD PRESSURE: 106 MMHG | TEMPERATURE: 97.5 F | BODY MASS INDEX: 27.16 KG/M2 | OXYGEN SATURATION: 99 %

## 2019-07-05 DIAGNOSIS — I95.89 HYPOTENSION DUE TO HYPOVOLEMIA: ICD-10-CM

## 2019-07-05 DIAGNOSIS — R19.7 NAUSEA VOMITING AND DIARRHEA: Primary | ICD-10-CM

## 2019-07-05 DIAGNOSIS — N17.9 ACUTE KIDNEY INJURY (HCC): ICD-10-CM

## 2019-07-05 DIAGNOSIS — R11.2 NAUSEA VOMITING AND DIARRHEA: Primary | ICD-10-CM

## 2019-07-05 DIAGNOSIS — E86.1 HYPOTENSION DUE TO HYPOVOLEMIA: ICD-10-CM

## 2019-07-05 PROBLEM — I95.9 HYPOTENSION: Status: ACTIVE | Noted: 2019-07-05

## 2019-07-05 LAB
ABSOLUTE EOS #: 0.1 K/UL (ref 0–0.4)
ABSOLUTE IMMATURE GRANULOCYTE: ABNORMAL K/UL (ref 0–0.3)
ABSOLUTE LYMPH #: 0.7 K/UL (ref 1–4.8)
ABSOLUTE MONO #: 1.1 K/UL (ref 0.1–1.3)
ALBUMIN SERPL-MCNC: 4.1 G/DL (ref 3.5–5.2)
ALBUMIN/GLOBULIN RATIO: ABNORMAL (ref 1–2.5)
ALP BLD-CCNC: 98 U/L (ref 40–129)
ALT SERPL-CCNC: 17 U/L (ref 5–41)
ANION GAP SERPL CALCULATED.3IONS-SCNC: 17 MMOL/L (ref 9–17)
AST SERPL-CCNC: 16 U/L
BASOPHILS # BLD: 0 % (ref 0–2)
BASOPHILS ABSOLUTE: 0 K/UL (ref 0–0.2)
BILIRUB SERPL-MCNC: 1.56 MG/DL (ref 0.3–1.2)
BUN BLDV-MCNC: 48 MG/DL (ref 8–23)
BUN/CREAT BLD: ABNORMAL (ref 9–20)
CALCIUM SERPL-MCNC: 9.6 MG/DL (ref 8.6–10.4)
CHLORIDE BLD-SCNC: 97 MMOL/L (ref 98–107)
CO2: 18 MMOL/L (ref 20–31)
CREAT SERPL-MCNC: 1.16 MG/DL (ref 0.7–1.2)
DIFFERENTIAL TYPE: ABNORMAL
EOSINOPHILS RELATIVE PERCENT: 1 % (ref 0–4)
GFR AFRICAN AMERICAN: >60 ML/MIN
GFR NON-AFRICAN AMERICAN: >60 ML/MIN
GFR SERPL CREATININE-BSD FRML MDRD: ABNORMAL ML/MIN/{1.73_M2}
GFR SERPL CREATININE-BSD FRML MDRD: ABNORMAL ML/MIN/{1.73_M2}
GLUCOSE BLD-MCNC: 183 MG/DL (ref 70–99)
HCT VFR BLD CALC: 44.7 % (ref 41–53)
HEMOGLOBIN: 15.9 G/DL (ref 13.5–17.5)
IMMATURE GRANULOCYTES: ABNORMAL %
LIPASE: 15 U/L (ref 13–60)
LYMPHOCYTES # BLD: 10 % (ref 24–44)
MAGNESIUM: 2 MG/DL (ref 1.6–2.6)
MCH RBC QN AUTO: 32.6 PG (ref 26–34)
MCHC RBC AUTO-ENTMCNC: 35.7 G/DL (ref 31–37)
MCV RBC AUTO: 91.4 FL (ref 80–100)
MONOCYTES # BLD: 15 % (ref 1–7)
NRBC AUTOMATED: ABNORMAL PER 100 WBC
PDW BLD-RTO: 13 % (ref 11.5–14.9)
PLATELET # BLD: 310 K/UL (ref 150–450)
PLATELET ESTIMATE: ABNORMAL
PMV BLD AUTO: 7.8 FL (ref 6–12)
POTASSIUM SERPL-SCNC: 4.3 MMOL/L (ref 3.7–5.3)
RBC # BLD: 4.89 M/UL (ref 4.5–5.9)
RBC # BLD: ABNORMAL 10*6/UL
SEG NEUTROPHILS: 74 % (ref 36–66)
SEGMENTED NEUTROPHILS ABSOLUTE COUNT: 5.4 K/UL (ref 1.3–9.1)
SODIUM BLD-SCNC: 132 MMOL/L (ref 135–144)
TOTAL PROTEIN: 7 G/DL (ref 6.4–8.3)
WBC # BLD: 7.3 K/UL (ref 3.5–11)
WBC # BLD: ABNORMAL 10*3/UL

## 2019-07-05 PROCEDURE — 83735 ASSAY OF MAGNESIUM: CPT

## 2019-07-05 PROCEDURE — 2500000003 HC RX 250 WO HCPCS: Performed by: EMERGENCY MEDICINE

## 2019-07-05 PROCEDURE — 96375 TX/PRO/DX INJ NEW DRUG ADDON: CPT

## 2019-07-05 PROCEDURE — 74177 CT ABD & PELVIS W/CONTRAST: CPT

## 2019-07-05 PROCEDURE — 6360000002 HC RX W HCPCS: Performed by: EMERGENCY MEDICINE

## 2019-07-05 PROCEDURE — 85025 COMPLETE CBC W/AUTO DIFF WBC: CPT

## 2019-07-05 PROCEDURE — 6360000004 HC RX CONTRAST MEDICATION: Performed by: EMERGENCY MEDICINE

## 2019-07-05 PROCEDURE — 2580000003 HC RX 258: Performed by: EMERGENCY MEDICINE

## 2019-07-05 PROCEDURE — 36415 COLL VENOUS BLD VENIPUNCTURE: CPT

## 2019-07-05 PROCEDURE — 83690 ASSAY OF LIPASE: CPT

## 2019-07-05 PROCEDURE — 80053 COMPREHEN METABOLIC PANEL: CPT

## 2019-07-05 PROCEDURE — 96374 THER/PROPH/DIAG INJ IV PUSH: CPT

## 2019-07-05 PROCEDURE — 99285 EMERGENCY DEPT VISIT HI MDM: CPT

## 2019-07-05 RX ORDER — DEXTROSE, SODIUM CHLORIDE, AND POTASSIUM CHLORIDE 5; .45; .15 G/100ML; G/100ML; G/100ML
INJECTION INTRAVENOUS CONTINUOUS
Status: CANCELLED | OUTPATIENT
Start: 2019-07-05

## 2019-07-05 RX ORDER — SODIUM CHLORIDE 0.9 % (FLUSH) 0.9 %
10 SYRINGE (ML) INJECTION PRN
Status: DISCONTINUED | OUTPATIENT
Start: 2019-07-05 | End: 2019-07-05 | Stop reason: HOSPADM

## 2019-07-05 RX ORDER — 0.9 % SODIUM CHLORIDE 0.9 %
1000 INTRAVENOUS SOLUTION INTRAVENOUS ONCE
Status: COMPLETED | OUTPATIENT
Start: 2019-07-05 | End: 2019-07-05

## 2019-07-05 RX ORDER — 0.9 % SODIUM CHLORIDE 0.9 %
80 INTRAVENOUS SOLUTION INTRAVENOUS ONCE
Status: COMPLETED | OUTPATIENT
Start: 2019-07-05 | End: 2019-07-05

## 2019-07-05 RX ORDER — SODIUM CHLORIDE 0.9 % (FLUSH) 0.9 %
10 SYRINGE (ML) INJECTION PRN
Status: CANCELLED | OUTPATIENT
Start: 2019-07-05

## 2019-07-05 RX ORDER — SODIUM CHLORIDE 0.9 % (FLUSH) 0.9 %
10 SYRINGE (ML) INJECTION EVERY 12 HOURS SCHEDULED
Status: CANCELLED | OUTPATIENT
Start: 2019-07-05

## 2019-07-05 RX ORDER — ACETAMINOPHEN 325 MG/1
650 TABLET ORAL EVERY 4 HOURS PRN
Status: CANCELLED | OUTPATIENT
Start: 2019-07-05

## 2019-07-05 RX ORDER — ONDANSETRON 2 MG/ML
8 INJECTION INTRAMUSCULAR; INTRAVENOUS ONCE
Status: COMPLETED | OUTPATIENT
Start: 2019-07-05 | End: 2019-07-05

## 2019-07-05 RX ORDER — ONDANSETRON 2 MG/ML
4 INJECTION INTRAMUSCULAR; INTRAVENOUS EVERY 8 HOURS PRN
Status: CANCELLED | OUTPATIENT
Start: 2019-07-05

## 2019-07-05 RX ADMIN — SODIUM CHLORIDE 1000 ML: 9 INJECTION, SOLUTION INTRAVENOUS at 12:09

## 2019-07-05 RX ADMIN — SODIUM CHLORIDE 80 ML: 9 INJECTION, SOLUTION INTRAVENOUS at 13:13

## 2019-07-05 RX ADMIN — IOVERSOL 75 ML: 741 INJECTION INTRA-ARTERIAL; INTRAVENOUS at 13:13

## 2019-07-05 RX ADMIN — Medication 10 ML: at 13:13

## 2019-07-05 RX ADMIN — ONDANSETRON 8 MG: 2 INJECTION INTRAMUSCULAR; INTRAVENOUS at 12:10

## 2019-07-05 RX ADMIN — FAMOTIDINE 20 MG: 10 INJECTION, SOLUTION INTRAVENOUS at 12:10

## 2019-07-05 RX ADMIN — SODIUM CHLORIDE 1000 ML: 9 INJECTION, SOLUTION INTRAVENOUS at 13:41

## 2019-07-05 ASSESSMENT — ENCOUNTER SYMPTOMS
ABDOMINAL PAIN: 1
DIARRHEA: 1
VOMITING: 1
NAUSEA: 1

## 2019-07-05 NOTE — ED PROVIDER NOTES
and no guarding. Musculoskeletal: Normal range of motion. He exhibits no edema or tenderness. Neurological: He is alert and oriented to person, place, and time. No cranial nerve deficit. Coordination normal.   Skin: Skin is warm and dry. No rash noted. He is not diaphoretic. No erythema. Psychiatric: He has a normal mood and affect. His behavior is normal. Judgment normal.       MEDICAL DECISION MAKING:       ED Course as of Jul 05 1608   Fri Jul 05, 2019   1332 Elevated bun and cr  Giving more ivf    [WM]   1404 Giving more fluids for hypotension  Admitting for ARF, hypotension, NVD, dehydration  Calling [facility]    []   [address] Dr Iva Galindo accepts admit    []      ED Course User Index  [WM] Theo Urias MD     Procedures    DIAGNOSTIC RESULTS       RADIOLOGY:All plain film, CT, MRI, and formal ultrasound images (except ED bedside ultrasound) are read by the radiologist, see reports below, unless otherwisenoted in MDM or here. CT ABDOMEN PELVIS W IV CONTRAST Additional Contrast? None   Final Result   1. Mild esophageal wall thickening suggestive of esophagitis. Liquid   contents in the small and large bowel compatible with the provided history. No acute inflammatory change otherwise appreciated in the abdomen or pelvis. 2.  Diverticulosis. 3.  1 cm left adrenal nodule, indeterminate on this exam.  In the absence of   known malignancy, this is likely a benign finding. LABS: All lab results were reviewed by myself, and all abnormals are listed below.   Labs Reviewed   CBC WITH AUTO DIFFERENTIAL - Abnormal; Notable for the following components:       Result Value    Seg Neutrophils 74 (*)     Lymphocytes 10 (*)     Monocytes 15 (*)     Absolute Lymph # 0.70 (*)     All other components within normal limits   COMPREHENSIVE METABOLIC PANEL - Abnormal; Notable for the following components:    Glucose 183 (*)     BUN 48 (*)     Sodium 132 (*)     Chloride 97 (*)     CO2 18 (*) Total Bilirubin 1.56 (*)     All other components within normal limits   LIPASE   MAGNESIUM       EMERGENCY DEPARTMENTCOURSE:         Vitals:    Vitals:    07/05/19 1412 07/05/19 1430 07/05/19 1445 07/05/19 1515   BP: 104/61 108/72 108/66 (!) 106/55   Pulse:  77 77    Resp:  23 19    Temp:       TempSrc:       SpO2:  100% 97% 99%   Weight:           The patient was given the following medications while in the emergency department:  Orders Placed This Encounter   Medications    ondansetron (ZOFRAN) injection 8 mg    0.9 % sodium chloride bolus    famotidine (PEPCID) injection 20 mg    ioversol (OPTIRAY) 74 % injection 75 mL    sodium chloride flush 0.9 % injection 10 mL    0.9 % sodium chloride bolus    0.9 % sodium chloride bolus     CONSULTS:  IP CONSULT TO INTERNAL MEDICINE    FINAL IMPRESSION      1. Nausea vomiting and diarrhea    2. Acute kidney injury ([address]    3. Hypotension due to hypovolemia          DISPOSITION/PLAN   DISPOSITION [place] 07/05/2019 04:07:25 PM      PATIENT REFERRED TO:  Leny Bah MD   [address] [place] [address] [address] [facility]  [phone]    Schedule an appointment as soon as possible for a visit in 1 day    Santiago Perez MD  Attending Emergency Physician                    Santiago Perez MD  07/05/19 4103      The patient has decided to leave against medical advice and is refusing all further workup and testing. He removes his own IV and starts walking out of the ED. The patient has normal mental status and adequate capacity to make medical decisions and has the capacity to make decisions. The patient refuses hospital admission and wants to be discharged. The risks have been explained to the patient, including worsening illness, chronic pain, permanent disability, loss of organs, kidney failure, and death.   The benefits of further testing and admission have also been explained, including the availability and proximity of nurses, physicians, monitoring, diagnostic testing, and

## 2019-07-08 ENCOUNTER — TELEPHONE (OUTPATIENT)
Dept: INTERNAL MEDICINE CLINIC | Age: 79
End: 2019-07-08

## 2019-08-19 DIAGNOSIS — F41.1 GAD (GENERALIZED ANXIETY DISORDER): Primary | ICD-10-CM

## 2019-08-20 ENCOUNTER — OFFICE VISIT (OUTPATIENT)
Dept: PODIATRY | Age: 79
End: 2019-08-20
Payer: MEDICARE

## 2019-08-20 VITALS — WEIGHT: 191 LBS | HEIGHT: 68 IN | BODY MASS INDEX: 28.95 KG/M2

## 2019-08-20 DIAGNOSIS — M79.674 PAIN OF TOES OF BOTH FEET: ICD-10-CM

## 2019-08-20 DIAGNOSIS — B35.1 ONYCHOMYCOSIS OF TOENAIL: Primary | ICD-10-CM

## 2019-08-20 DIAGNOSIS — E11.51 TYPE 2 DIABETES MELLITUS WITH PERIPHERAL VASCULAR DISEASE (HCC): ICD-10-CM

## 2019-08-20 DIAGNOSIS — M79.675 PAIN OF TOES OF BOTH FEET: ICD-10-CM

## 2019-08-20 DIAGNOSIS — E11.42 DIABETIC POLYNEUROPATHY ASSOCIATED WITH TYPE 2 DIABETES MELLITUS (HCC): ICD-10-CM

## 2019-08-20 PROCEDURE — 11721 DEBRIDE NAIL 6 OR MORE: CPT | Performed by: PODIATRIST

## 2019-08-20 RX ORDER — ALPRAZOLAM 0.25 MG/1
TABLET ORAL
Qty: 30 TABLET | Refills: 0 | Status: SHIPPED | OUTPATIENT
Start: 2019-08-20 | End: 2019-09-19

## 2019-08-21 ASSESSMENT — ENCOUNTER SYMPTOMS
NAUSEA: 0
BACK PAIN: 0
DIARRHEA: 0
COLOR CHANGE: 0
SHORTNESS OF BREATH: 0

## 2019-10-23 ENCOUNTER — TELEPHONE (OUTPATIENT)
Dept: INTERNAL MEDICINE CLINIC | Age: 79
End: 2019-10-23

## 2019-10-29 DIAGNOSIS — K21.00 GASTROESOPHAGEAL REFLUX DISEASE WITH ESOPHAGITIS: ICD-10-CM

## 2019-10-29 RX ORDER — FAMOTIDINE 20 MG/1
TABLET, FILM COATED ORAL
Qty: 60 TABLET | Refills: 3 | Status: SHIPPED | OUTPATIENT
Start: 2019-10-29 | End: 2020-02-13

## 2019-12-05 ENCOUNTER — HOSPITAL ENCOUNTER (EMERGENCY)
Age: 79
Discharge: HOME | End: 2019-12-05
Attending: EMERGENCY MEDICINE
Payer: MEDICARE

## 2019-12-05 ENCOUNTER — APPOINTMENT (OUTPATIENT)
Dept: GENERAL RADIOLOGY | Age: 79
End: 2019-12-05
Payer: MEDICARE

## 2019-12-05 ENCOUNTER — OFFICE VISIT (OUTPATIENT)
Dept: FAMILY MEDICINE CLINIC | Age: 79
End: 2019-12-05

## 2019-12-05 VITALS
SYSTOLIC BLOOD PRESSURE: 155 MMHG | TEMPERATURE: 97.4 F | OXYGEN SATURATION: 100 % | WEIGHT: 192 LBS | RESPIRATION RATE: 16 BRPM | HEART RATE: 82 BPM | DIASTOLIC BLOOD PRESSURE: 81 MMHG | BODY MASS INDEX: 29.19 KG/M2

## 2019-12-05 VITALS
TEMPERATURE: 97.4 F | OXYGEN SATURATION: 96 % | SYSTOLIC BLOOD PRESSURE: 125 MMHG | HEART RATE: 89 BPM | DIASTOLIC BLOOD PRESSURE: 83 MMHG

## 2019-12-05 DIAGNOSIS — M79.662 PAIN AND SWELLING OF LEFT LOWER LEG: Primary | ICD-10-CM

## 2019-12-05 DIAGNOSIS — M79.89 PAIN AND SWELLING OF LEFT LOWER LEG: Primary | ICD-10-CM

## 2019-12-05 DIAGNOSIS — M79.89 PAIN AND SWELLING OF LOWER LEG, LEFT: Primary | ICD-10-CM

## 2019-12-05 DIAGNOSIS — M79.662 PAIN AND SWELLING OF LOWER LEG, LEFT: Primary | ICD-10-CM

## 2019-12-05 PROCEDURE — 73562 X-RAY EXAM OF KNEE 3: CPT

## 2019-12-05 PROCEDURE — 73610 X-RAY EXAM OF ANKLE: CPT

## 2019-12-05 PROCEDURE — 73630 X-RAY EXAM OF FOOT: CPT

## 2019-12-05 PROCEDURE — 93971 EXTREMITY STUDY: CPT

## 2019-12-05 PROCEDURE — 99284 EMERGENCY DEPT VISIT MOD MDM: CPT

## 2019-12-05 PROCEDURE — 73590 X-RAY EXAM OF LOWER LEG: CPT

## 2019-12-05 ASSESSMENT — PAIN SCALES - GENERAL: PAINLEVEL_OUTOF10: 8

## 2019-12-05 ASSESSMENT — ENCOUNTER SYMPTOMS
ABDOMINAL PAIN: 0
WHEEZING: 0
BACK PAIN: 1
SHORTNESS OF BREATH: 0
DIARRHEA: 0
VOMITING: 0
COUGH: 0
CHEST TIGHTNESS: 0
NAUSEA: 0

## 2019-12-05 ASSESSMENT — PAIN DESCRIPTION - LOCATION: LOCATION: KNEE

## 2019-12-05 ASSESSMENT — PAIN DESCRIPTION - PAIN TYPE: TYPE: ACUTE PAIN

## 2019-12-05 ASSESSMENT — PAIN DESCRIPTION - DESCRIPTORS: DESCRIPTORS: DISCOMFORT

## 2019-12-05 ASSESSMENT — PAIN DESCRIPTION - ORIENTATION: ORIENTATION: LEFT

## 2019-12-09 NOTE — PROGRESS NOTES
History of Present Illness: I have reviewed and appreciated the technician's history and test results as outlined above.      CC: Pt is here for visual health exam and dilated retinal exam. Pt states that she had cataract surgery about 3 years ago. She than had laser done in the left eye for the cloudy vision, but was told that it could not be fixed anymore.         Ocular Medications: none           Personal Ocular History: WEARS GLASSES, PSEUDOPHAKIA , BOTH EYES and YAG LASER POSTERIOR CAPSULOTOMY, LEFT EYE     Slit Lamp exam:  LIDS, LASHES, LACRIMAL: OD (RIGHT EYE) // normal  OS (LEFT EYE): // normal    CONJUCTIVA OD (RIGHT EYE):// clear OS (LEFT EYE) :// clear    CORNEA:  OD (RIGHT EYE):// clear OS (LEFT EYE):// clear   ANTERIOR CHAMBER:  OD (RIGHT EYE):// deep and quiet OS (LEFT EYE): // deep and quiet    IRIS:  OD (RIGHT EYE)://round and regular without neovascularization OS (LEFT EYE): // round and regular without neovascularization   LENS:  OD (RIGHT EYE): // PC IOL, well centered, stable with clear posterior capsulotomy OS (LEFT EYE): // PC IOL, well centered, stable with clear posterior capsulotomy        Posterior Segment Exam:    Vitreous:  OD (Right eye): clear // OS (left eye): clear  Optic Nerve:  OD: flat, pink, healthy with + SVP and C/D 0.40 // OS: flat, pink, healthy with + SVP and C/D 0.45  Macula:  OD: 2+ epiretinal membrane // OS: healthy with sharp foveal reflex  Vessels:  OD: healthy with normal A-V ratio // OS: healthy with normal A-V ratio  Periphery:  OD: flat, healthy without tears or retinal detachment // OS: flat, healthy without tears or retinal detachment      OCT:  OD normal no pathology OS: +2 epiretinal membrane    DX (DIAGNOSIS): IOL, OS: Epiretinal membrane, stable no treatment at this time    TX (TREATMENT):  Copy of MR  Patient is generally getting along okay and will be monitored         Subjective:      Patient ID: Roberto Wu is a [de-identified] y.o. male. HPI  Please refer to my previous clinic notes    Patient's her follow-up low back pain with neurogenic claudication some neuropathy bilateral lower extremities. Patient has failed lumbar epidural steroid injections. Patient is here for follow-up of his MRI. Review of Systems    Objective:   Physical Exam   Constitutional: He is oriented to person, place, and time. He appears well-developed and well-nourished. HENT:   Head: Normocephalic and atraumatic. Eyes: Conjunctivae and EOM are normal.   Neck: Normal range of motion. Pulmonary/Chest: Effort normal. No respiratory distress. Neurological: He is alert and oriented to person, place, and time. He has normal strength. No sensory deficit. Normal gait   Skin: Skin is warm and dry. Psychiatric: His behavior is normal. Thought content normal.   Nursing note and vitals reviewed. patient with a slightly short striated wide-based gait utilizes a cane    Back reveals a forward hunched in the lumbar spine    MRI lumbar spine is reviewed patient has severe multilevel lumbar spondylosis near ankylosis severe disc space collapse multiple levels; patient with fairly severe lumbar spinal stenosis L2-3 L3 4; a retrolisthesis L2-3 but anteriorly bridging syndesmophyte    Assessment:      Encounter Diagnoses   Name Primary?     Chronic midline low back pain with sciatica, sciatica laterality unspecified Yes    Lumbar degenerative disc disease     Scoliosis (and kyphoscoliosis), idiopathic     Acquired spondylolisthesis            Plan:      Okay to schedule L2 to L4 posterior decompression    Wrist benefits complications discussed        Issac Cooper MD

## 2019-12-11 ENCOUNTER — TELEPHONE (OUTPATIENT)
Dept: INTERNAL MEDICINE CLINIC | Age: 79
End: 2019-12-11

## 2019-12-11 ENCOUNTER — OFFICE VISIT (OUTPATIENT)
Dept: INTERNAL MEDICINE CLINIC | Age: 79
End: 2019-12-11
Payer: MEDICARE

## 2019-12-11 ENCOUNTER — HOSPITAL ENCOUNTER (EMERGENCY)
Age: 79
Discharge: HOME | End: 2019-12-11
Attending: EMERGENCY MEDICINE
Payer: MEDICARE

## 2019-12-11 VITALS
SYSTOLIC BLOOD PRESSURE: 132 MMHG | DIASTOLIC BLOOD PRESSURE: 82 MMHG | HEIGHT: 71 IN | BODY MASS INDEX: 26.51 KG/M2 | HEART RATE: 89 BPM | OXYGEN SATURATION: 98 %

## 2019-12-11 VITALS
BODY MASS INDEX: 26.6 KG/M2 | SYSTOLIC BLOOD PRESSURE: 159 MMHG | DIASTOLIC BLOOD PRESSURE: 85 MMHG | TEMPERATURE: 98.1 F | RESPIRATION RATE: 16 BRPM | HEART RATE: 86 BPM | OXYGEN SATURATION: 98 % | WEIGHT: 190 LBS | HEIGHT: 71 IN

## 2019-12-11 DIAGNOSIS — M79.89 LEG SWELLING: ICD-10-CM

## 2019-12-11 DIAGNOSIS — M79.89 LEFT LEG SWELLING: Primary | ICD-10-CM

## 2019-12-11 DIAGNOSIS — E11.9 TYPE 2 DIABETES MELLITUS WITHOUT COMPLICATION, WITHOUT LONG-TERM CURRENT USE OF INSULIN (HCC): Primary | ICD-10-CM

## 2019-12-11 DIAGNOSIS — R26.89 IMBALANCE: ICD-10-CM

## 2019-12-11 DIAGNOSIS — M79.605 LEFT LEG PAIN: ICD-10-CM

## 2019-12-11 LAB — HBA1C MFR BLD: 7.3 %

## 2019-12-11 PROCEDURE — 83036 HEMOGLOBIN GLYCOSYLATED A1C: CPT | Performed by: INTERNAL MEDICINE

## 2019-12-11 PROCEDURE — 99283 EMERGENCY DEPT VISIT LOW MDM: CPT

## 2019-12-11 PROCEDURE — 99214 OFFICE O/P EST MOD 30 MIN: CPT | Performed by: INTERNAL MEDICINE

## 2019-12-11 RX ORDER — FUROSEMIDE 20 MG/1
20 TABLET ORAL DAILY
Qty: 60 TABLET | Refills: 3 | Status: SHIPPED | OUTPATIENT
Start: 2019-12-11 | End: 2021-02-23

## 2019-12-11 RX ORDER — TRAMADOL HYDROCHLORIDE 50 MG/1
50 TABLET ORAL EVERY 6 HOURS PRN
Qty: 15 TABLET | Refills: 0 | Status: SHIPPED | OUTPATIENT
Start: 2019-12-11 | End: 2019-12-16

## 2019-12-11 ASSESSMENT — ENCOUNTER SYMPTOMS
NAUSEA: 0
TROUBLE SWALLOWING: 0
SHORTNESS OF BREATH: 0
COUGH: 0
VOMITING: 0

## 2019-12-11 ASSESSMENT — PAIN DESCRIPTION - LOCATION: LOCATION: ANKLE

## 2019-12-11 ASSESSMENT — PAIN DESCRIPTION - PAIN TYPE: TYPE: ACUTE PAIN

## 2019-12-11 ASSESSMENT — PAIN SCALES - GENERAL: PAINLEVEL_OUTOF10: 3

## 2019-12-11 ASSESSMENT — PAIN DESCRIPTION - ORIENTATION: ORIENTATION: RIGHT;LEFT

## 2019-12-13 ENCOUNTER — TELEPHONE (OUTPATIENT)
Dept: INTERNAL MEDICINE CLINIC | Age: 79
End: 2019-12-13

## 2019-12-18 ENCOUNTER — TELEPHONE (OUTPATIENT)
Dept: INTERNAL MEDICINE CLINIC | Age: 79
End: 2019-12-18

## 2019-12-18 DIAGNOSIS — M25.562 ACUTE PAIN OF LEFT KNEE: Primary | ICD-10-CM

## 2019-12-18 RX ORDER — OXYCODONE HYDROCHLORIDE AND ACETAMINOPHEN 5; 325 MG/1; MG/1
1 TABLET ORAL EVERY 8 HOURS PRN
Qty: 15 TABLET | Refills: 0 | Status: SHIPPED | OUTPATIENT
Start: 2019-12-18 | End: 2019-12-23

## 2019-12-22 ASSESSMENT — ENCOUNTER SYMPTOMS
ABDOMINAL PAIN: 0
COUGH: 0
SHORTNESS OF BREATH: 0
APNEA: 0
ABDOMINAL DISTENTION: 0
DIARRHEA: 0
WHEEZING: 0
CONSTIPATION: 0
BACK PAIN: 0
COLOR CHANGE: 0
CHEST TIGHTNESS: 0
FACIAL SWELLING: 0

## 2019-12-23 ENCOUNTER — TELEPHONE (OUTPATIENT)
Dept: INTERNAL MEDICINE CLINIC | Age: 79
End: 2019-12-23

## 2019-12-27 ENCOUNTER — HOSPITAL ENCOUNTER (OUTPATIENT)
Dept: MRI IMAGING | Facility: CLINIC | Age: 79
Discharge: HOME | End: 2019-12-29
Payer: MEDICARE

## 2019-12-27 DIAGNOSIS — M25.562 ACUTE PAIN OF LEFT KNEE: ICD-10-CM

## 2019-12-27 PROCEDURE — 73721 MRI JNT OF LWR EXTRE W/O DYE: CPT

## 2020-01-02 ENCOUNTER — OFFICE VISIT (OUTPATIENT)
Dept: ORTHOPEDIC SURGERY | Age: 80
End: 2020-01-02
Payer: MEDICARE

## 2020-01-02 ENCOUNTER — TELEPHONE (OUTPATIENT)
Dept: INTERNAL MEDICINE CLINIC | Age: 80
End: 2020-01-02

## 2020-01-02 PROBLEM — S83.272A COMPLEX TEAR OF LATERAL MENISCUS OF LEFT KNEE AS CURRENT INJURY: Status: ACTIVE | Noted: 2020-01-02

## 2020-01-02 PROBLEM — S83.232A COMPLEX TEAR OF MEDIAL MENISCUS OF LEFT KNEE AS CURRENT INJURY: Status: ACTIVE | Noted: 2020-01-02

## 2020-01-02 PROBLEM — S82.142D CLOSED FRACTURE OF LEFT TIBIAL PLATEAU WITH ROUTINE HEALING: Status: ACTIVE | Noted: 2020-01-02

## 2020-01-02 PROCEDURE — 99213 OFFICE O/P EST LOW 20 MIN: CPT | Performed by: ORTHOPAEDIC SURGERY

## 2020-01-02 NOTE — PROGRESS NOTES
Patient ID: Nathan Canales is a 78 y.o. male    Chief Compliant:  Chief Complaint   Patient presents with    Pain     left knee         History of Present Illness: This is a 78 y.o. male who presents to the clinic today for follow up of left knee pain. He notes his leg gave out and fell on 11/8/19. He notes that he did not have much pain upon fall and gradually progress with pain the first week of December. Patient had MRI on 12/27/19 of his left knee that showed medial meniscus tear and tibial plateau fracture. He has been using a walker to get himself around and scoots himself around the house. He is now having pain around the ankles. Has been taking Tylenol, Aspirin, and Aleve for the pain. Review of Systems   Constitutional: Negative for fever, sweats, weight loss, recent injury, or recent illness  Neurological: Negative for  headaches, numbness, or focal weakness. Integumentary: Negative for abrasion, laceration, rash, ecchymosis. Musculoskeletal: Positive for Pain (left knee )         Physical Exam:  Constitutional: Patient is oriented to person, place, and time. Patient appears well-developed and well nourished. HENT: Negative otherwise noted  Head: Normocephalic and Atraumatic  Nose: Normal  Eyes: Conjunctivae and EOM are normal  Neck: Normal range of motion Neck supple. Respiratory/Cardio: Effort normal. No respiratory distress. Musculoskeletal: Wheel chair bound. No calf tenderness, negative Leeann's sign, neurovascularly intact. Neurological: Patient is alert and oriented to person, place, and time. Normal strenght. No sensory deficit. Skin: Skin is warm and dry  Psychiatric: Behavior is normal. Thought content normal.  Nursing note and vitals reviewed.        Past History:    Current Outpatient Medications:     furosemide (LASIX) 20 MG tablet, Take 1 tablet by mouth daily, Disp: 60 tablet, Rfl: 3    famotidine (PEPCID) 20 MG tablet, TAKE 1 TABLET BY MOUTH TWICE A DAY, Disp: 60 tablet, Rfl: 3    losartan (COZAAR) 25 MG tablet, Take 1 tablet by mouth daily, Disp: 90 tablet, Rfl: 1    ACCU-CHEK AZEB PLUS strip, USE TO TEST 2 TO 3 TIMES DAILY, Disp: 100 strip, Rfl: 3    levothyroxine (SYNTHROID) 125 MCG tablet, Take 1 tablet by mouth Daily, Disp: 90 tablet, Rfl: 3    Blood Pressure Monitoring (B-D ASSURE BPM/AUTO ARM CUFF) MISC, 1 Device by Does not apply route daily, Disp: 1 each, Rfl: 0    Omega-3 Fatty Acids (FISH OIL) 1000 MG CAPS, Take 1,000 mg by mouth daily , Disp: , Rfl:     latanoprost (XALATAN) 0.005 % ophthalmic solution, Place 1 drop into both eyes daily, Disp: , Rfl:     aspirin 325 MG tablet, Take 325 mg by mouth every 6 hours as needed for Pain , Disp: , Rfl:     Cholecalciferol (VITAMIN D3) 1000 units TABS, Take 1,000 Units by mouth daily , Disp: , Rfl:     vitamin E 1000 units capsule, Take 1,000 Units by mouth daily, Disp: , Rfl:     docusate sodium (COLACE) 100 MG capsule, Take 100 mg by mouth daily , Disp: , Rfl:     vitamin B-12 (CYANOCOBALAMIN) 1000 MCG tablet, Take 1,000 mcg by mouth daily, Disp: , Rfl:   Allergies   Allergen Reactions    Lisinopril Other (See Comments)     COUGH      Social History     Socioeconomic History    Marital status: Single     Spouse name: Not on file    Number of children: 0    Years of education: Not on file    Highest education level: Not on file   Occupational History    Not on file   Social Needs    Financial resource strain: Not on file    Food insecurity:     Worry: Not on file     Inability: Not on file    Transportation needs:     Medical: Not on file     Non-medical: Not on file   Tobacco Use    Smoking status: Former Smoker     Packs/day: 1.50     Years: 40.00     Pack years: 60.00     Types: Cigarettes     Last attempt to quit: 1988     Years since quittin.2    Smokeless tobacco: Never Used   Substance and Sexual Activity    Alcohol use: No    Drug use: No    Sexual activity: Not on file   Lifestyle    encounter          This is a 78 y.o. male who presents to the clinic today for left knee pain. Patient has a left tibial plateau fracture from a fall on 11/5/19. Patient had MRI that did confirm the fracture on the left. He had been getting around with a walker/wheelchair however he has increased pain over the course of the 6 weeks. I spoke with patient about options at this time as the likely best course of action is to allow his fracture to heal. I advised for him to refrain from putting weight on the left leg and rest.  Follow up in 2 weeks for new XRs and will re-evaluate from there. Scribe Attestation:  By signing my name below, Mabel Ambrose, attest that this documentation has been prepared under the direction and in the presence of Dr. Lupe Perez. Electronically signed: Olga Martin, 1/2/20     Please note that this chart was generated using voice recognition Dragon dictation software. Although every effort was made to ensure the accuracy of this automated transcription, some errors in transcription may have occurred.

## 2020-01-13 RX ORDER — BLOOD SUGAR DIAGNOSTIC
STRIP MISCELLANEOUS
Qty: 100 STRIP | Refills: 3 | Status: SHIPPED | OUTPATIENT
Start: 2020-01-13 | End: 2021-02-23

## 2020-01-28 ENCOUNTER — OFFICE VISIT (OUTPATIENT)
Dept: ORTHOPEDIC SURGERY | Age: 80
End: 2020-01-28
Payer: MEDICARE

## 2020-01-28 PROCEDURE — 99213 OFFICE O/P EST LOW 20 MIN: CPT | Performed by: ORTHOPAEDIC SURGERY

## 2020-02-08 ENCOUNTER — APPOINTMENT (OUTPATIENT)
Dept: CT IMAGING | Age: 80
End: 2020-02-08
Payer: MEDICARE

## 2020-02-08 ENCOUNTER — HOSPITAL ENCOUNTER (OUTPATIENT)
Age: 80
Setting detail: OBSERVATION
Discharge: HOME | End: 2020-02-10
Attending: EMERGENCY MEDICINE | Admitting: INTERNAL MEDICINE
Payer: MEDICARE

## 2020-02-08 PROBLEM — K92.2 GI BLEED: Status: ACTIVE | Noted: 2020-02-08

## 2020-02-08 LAB
ABSOLUTE EOS #: 0.1 K/UL (ref 0–0.4)
ABSOLUTE IMMATURE GRANULOCYTE: ABNORMAL K/UL (ref 0–0.3)
ABSOLUTE LYMPH #: 1.5 K/UL (ref 1–4.8)
ABSOLUTE MONO #: 0.5 K/UL (ref 0.1–1.3)
ALBUMIN SERPL-MCNC: 3.8 G/DL (ref 3.5–5.2)
ALBUMIN/GLOBULIN RATIO: ABNORMAL (ref 1–2.5)
ALP BLD-CCNC: 197 U/L (ref 40–129)
ALT SERPL-CCNC: 19 U/L (ref 5–41)
ANION GAP SERPL CALCULATED.3IONS-SCNC: 13 MMOL/L (ref 9–17)
AST SERPL-CCNC: 20 U/L
BASOPHILS # BLD: 1 % (ref 0–2)
BASOPHILS ABSOLUTE: 0.1 K/UL (ref 0–0.2)
BILIRUB SERPL-MCNC: 0.31 MG/DL (ref 0.3–1.2)
BNP INTERPRETATION: ABNORMAL
BUN BLDV-MCNC: 22 MG/DL (ref 8–23)
BUN/CREAT BLD: ABNORMAL (ref 9–20)
CALCIUM SERPL-MCNC: 9.9 MG/DL (ref 8.6–10.4)
CHLORIDE BLD-SCNC: 100 MMOL/L (ref 98–107)
CO2: 25 MMOL/L (ref 20–31)
CREAT SERPL-MCNC: 0.78 MG/DL (ref 0.7–1.2)
DIFFERENTIAL TYPE: ABNORMAL
EOSINOPHILS RELATIVE PERCENT: 2 % (ref 0–4)
GFR AFRICAN AMERICAN: >60 ML/MIN
GFR NON-AFRICAN AMERICAN: >60 ML/MIN
GFR SERPL CREATININE-BSD FRML MDRD: ABNORMAL ML/MIN/{1.73_M2}
GFR SERPL CREATININE-BSD FRML MDRD: ABNORMAL ML/MIN/{1.73_M2}
GLUCOSE BLD-MCNC: 187 MG/DL (ref 70–99)
HCT VFR BLD CALC: 36.7 % (ref 41–53)
HEMOGLOBIN: 12.6 G/DL (ref 13.5–17.5)
IMMATURE GRANULOCYTES: ABNORMAL %
INR BLD: 1.1
LACTIC ACID: 2.2 MMOL/L (ref 0.5–2.2)
LIPASE: 38 U/L (ref 13–60)
LYMPHOCYTES # BLD: 21 % (ref 24–44)
MCH RBC QN AUTO: 31.4 PG (ref 26–34)
MCHC RBC AUTO-ENTMCNC: 34.4 G/DL (ref 31–37)
MCV RBC AUTO: 91.4 FL (ref 80–100)
MONOCYTES # BLD: 8 % (ref 1–7)
NRBC AUTOMATED: ABNORMAL PER 100 WBC
PARTIAL THROMBOPLASTIN TIME: 31.1 SEC (ref 24–36)
PDW BLD-RTO: 13.5 % (ref 11.5–14.9)
PLATELET # BLD: 293 K/UL (ref 150–450)
PLATELET ESTIMATE: ABNORMAL
PMV BLD AUTO: 7.4 FL (ref 6–12)
POTASSIUM SERPL-SCNC: 4.2 MMOL/L (ref 3.7–5.3)
PRO-BNP: 538 PG/ML
PROTHROMBIN TIME: 13.6 SEC (ref 11.8–14.6)
RBC # BLD: 4.02 M/UL (ref 4.5–5.9)
RBC # BLD: ABNORMAL 10*6/UL
SEG NEUTROPHILS: 68 % (ref 36–66)
SEGMENTED NEUTROPHILS ABSOLUTE COUNT: 5 K/UL (ref 1.3–9.1)
SODIUM BLD-SCNC: 138 MMOL/L (ref 135–144)
TOTAL PROTEIN: 6.5 G/DL (ref 6.4–8.3)
TROPONIN INTERP: ABNORMAL
TROPONIN INTERP: NORMAL
TROPONIN T: ABNORMAL NG/ML
TROPONIN T: NORMAL NG/ML
TROPONIN, HIGH SENSITIVITY: 20 NG/L (ref 0–22)
TROPONIN, HIGH SENSITIVITY: 23 NG/L (ref 0–22)
WBC # BLD: 7.3 K/UL (ref 3.5–11)
WBC # BLD: ABNORMAL 10*3/UL

## 2020-02-08 PROCEDURE — 83880 ASSAY OF NATRIURETIC PEPTIDE: CPT

## 2020-02-08 PROCEDURE — 85730 THROMBOPLASTIN TIME PARTIAL: CPT

## 2020-02-08 PROCEDURE — 83690 ASSAY OF LIPASE: CPT

## 2020-02-08 PROCEDURE — G0378 HOSPITAL OBSERVATION PER HR: HCPCS

## 2020-02-08 PROCEDURE — 84484 ASSAY OF TROPONIN QUANT: CPT

## 2020-02-08 PROCEDURE — 83605 ASSAY OF LACTIC ACID: CPT

## 2020-02-08 PROCEDURE — 2580000003 HC RX 258: Performed by: EMERGENCY MEDICINE

## 2020-02-08 PROCEDURE — 93005 ELECTROCARDIOGRAM TRACING: CPT | Performed by: EMERGENCY MEDICINE

## 2020-02-08 PROCEDURE — 85025 COMPLETE CBC W/AUTO DIFF WBC: CPT

## 2020-02-08 PROCEDURE — 99285 EMERGENCY DEPT VISIT HI MDM: CPT

## 2020-02-08 PROCEDURE — 80053 COMPREHEN METABOLIC PANEL: CPT

## 2020-02-08 PROCEDURE — 85610 PROTHROMBIN TIME: CPT

## 2020-02-08 PROCEDURE — 36415 COLL VENOUS BLD VENIPUNCTURE: CPT

## 2020-02-08 RX ORDER — SODIUM CHLORIDE 0.9 % (FLUSH) 0.9 %
10 SYRINGE (ML) INJECTION PRN
Status: DISCONTINUED | OUTPATIENT
Start: 2020-02-08 | End: 2020-02-10 | Stop reason: HOSPADM

## 2020-02-08 RX ORDER — SODIUM CHLORIDE 0.9 % (FLUSH) 0.9 %
10 SYRINGE (ML) INJECTION EVERY 12 HOURS SCHEDULED
Status: DISCONTINUED | OUTPATIENT
Start: 2020-02-09 | End: 2020-02-10 | Stop reason: HOSPADM

## 2020-02-08 RX ORDER — 0.9 % SODIUM CHLORIDE 0.9 %
1000 INTRAVENOUS SOLUTION INTRAVENOUS ONCE
Status: COMPLETED | OUTPATIENT
Start: 2020-02-08 | End: 2020-02-09

## 2020-02-08 RX ORDER — 0.9 % SODIUM CHLORIDE 0.9 %
80 INTRAVENOUS SOLUTION INTRAVENOUS ONCE
Status: DISCONTINUED | OUTPATIENT
Start: 2020-02-08 | End: 2020-02-10 | Stop reason: HOSPADM

## 2020-02-08 RX ORDER — ACETAMINOPHEN 325 MG/1
650 TABLET ORAL EVERY 4 HOURS PRN
Status: DISCONTINUED | OUTPATIENT
Start: 2020-02-08 | End: 2020-02-10 | Stop reason: HOSPADM

## 2020-02-08 RX ADMIN — SODIUM CHLORIDE 1000 ML: 9 INJECTION, SOLUTION INTRAVENOUS at 21:27

## 2020-02-08 ASSESSMENT — PAIN SCALES - GENERAL: PAINLEVEL_OUTOF10: 0

## 2020-02-09 PROBLEM — I10 ESSENTIAL HYPERTENSION: Status: ACTIVE | Noted: 2020-02-09

## 2020-02-09 LAB
FERRITIN: 262 UG/L (ref 30–400)
HCT VFR BLD CALC: 31.4 % (ref 41–53)
HCT VFR BLD CALC: 33 % (ref 41–53)
HCT VFR BLD CALC: 33.9 % (ref 41–53)
HEMOGLOBIN: 11.3 G/DL (ref 13.5–17.5)
HEMOGLOBIN: 11.6 G/DL (ref 13.5–17.5)
HEMOGLOBIN: 11.7 G/DL (ref 13.5–17.5)
IRON SATURATION: 37 % (ref 20–55)
IRON: 74 UG/DL (ref 59–158)
TOTAL IRON BINDING CAPACITY: 199 UG/DL (ref 250–450)
UNSATURATED IRON BINDING CAPACITY: 125 UG/DL (ref 112–347)

## 2020-02-09 PROCEDURE — APPNB30 APP NON BILLABLE TIME 0-30 MINS: Performed by: NURSE PRACTITIONER

## 2020-02-09 PROCEDURE — C9113 INJ PANTOPRAZOLE SODIUM, VIA: HCPCS | Performed by: INTERNAL MEDICINE

## 2020-02-09 PROCEDURE — 85018 HEMOGLOBIN: CPT

## 2020-02-09 PROCEDURE — 6370000000 HC RX 637 (ALT 250 FOR IP): Performed by: STUDENT IN AN ORGANIZED HEALTH CARE EDUCATION/TRAINING PROGRAM

## 2020-02-09 PROCEDURE — 2580000003 HC RX 258: Performed by: STUDENT IN AN ORGANIZED HEALTH CARE EDUCATION/TRAINING PROGRAM

## 2020-02-09 PROCEDURE — 82728 ASSAY OF FERRITIN: CPT

## 2020-02-09 PROCEDURE — 6370000000 HC RX 637 (ALT 250 FOR IP): Performed by: NURSE PRACTITIONER

## 2020-02-09 PROCEDURE — 96376 TX/PRO/DX INJ SAME DRUG ADON: CPT

## 2020-02-09 PROCEDURE — 99220 PR INITIAL OBSERVATION CARE/DAY 70 MINUTES: CPT | Performed by: INTERNAL MEDICINE

## 2020-02-09 PROCEDURE — 96374 THER/PROPH/DIAG INJ IV PUSH: CPT

## 2020-02-09 PROCEDURE — 83550 IRON BINDING TEST: CPT

## 2020-02-09 PROCEDURE — 85014 HEMATOCRIT: CPT

## 2020-02-09 PROCEDURE — G0378 HOSPITAL OBSERVATION PER HR: HCPCS

## 2020-02-09 PROCEDURE — 36415 COLL VENOUS BLD VENIPUNCTURE: CPT

## 2020-02-09 PROCEDURE — 6360000002 HC RX W HCPCS: Performed by: INTERNAL MEDICINE

## 2020-02-09 PROCEDURE — 2580000003 HC RX 258: Performed by: INTERNAL MEDICINE

## 2020-02-09 PROCEDURE — 99219 PR INITIAL OBSERVATION CARE/DAY 50 MINUTES: CPT | Performed by: INTERNAL MEDICINE

## 2020-02-09 PROCEDURE — 6370000000 HC RX 637 (ALT 250 FOR IP): Performed by: INTERNAL MEDICINE

## 2020-02-09 PROCEDURE — 83540 ASSAY OF IRON: CPT

## 2020-02-09 RX ORDER — LEVOTHYROXINE SODIUM 0.12 MG/1
125 TABLET ORAL DAILY
Status: DISCONTINUED | OUTPATIENT
Start: 2020-02-09 | End: 2020-02-10 | Stop reason: HOSPADM

## 2020-02-09 RX ORDER — SODIUM CHLORIDE, SODIUM LACTATE, POTASSIUM CHLORIDE, CALCIUM CHLORIDE 600; 310; 30; 20 MG/100ML; MG/100ML; MG/100ML; MG/100ML
INJECTION, SOLUTION INTRAVENOUS CONTINUOUS
Status: DISCONTINUED | OUTPATIENT
Start: 2020-02-09 | End: 2020-02-10 | Stop reason: HOSPADM

## 2020-02-09 RX ORDER — PANTOPRAZOLE SODIUM 40 MG/10ML
40 INJECTION, POWDER, LYOPHILIZED, FOR SOLUTION INTRAVENOUS 2 TIMES DAILY
Status: DISCONTINUED | OUTPATIENT
Start: 2020-02-09 | End: 2020-02-10 | Stop reason: HOSPADM

## 2020-02-09 RX ORDER — SODIUM CHLORIDE 9 MG/ML
10 INJECTION INTRAVENOUS 2 TIMES DAILY
Status: DISCONTINUED | OUTPATIENT
Start: 2020-02-09 | End: 2020-02-10 | Stop reason: HOSPADM

## 2020-02-09 RX ADMIN — Medication 10 ML: at 08:00

## 2020-02-09 RX ADMIN — SODIUM CHLORIDE 10 ML: 9 INJECTION, SOLUTION INTRAMUSCULAR; INTRAVENOUS; SUBCUTANEOUS at 20:57

## 2020-02-09 RX ADMIN — POLYETHYLENE GLYCOL 3350, SODIUM SULFATE ANHYDROUS, SODIUM BICARBONATE, SODIUM CHLORIDE, POTASSIUM CHLORIDE 2000 ML: 236; 22.74; 6.74; 5.86; 2.97 POWDER, FOR SOLUTION ORAL at 23:17

## 2020-02-09 RX ADMIN — PANTOPRAZOLE SODIUM 40 MG: 40 INJECTION, POWDER, FOR SOLUTION INTRAVENOUS at 07:58

## 2020-02-09 RX ADMIN — PANTOPRAZOLE SODIUM 40 MG: 40 INJECTION, POWDER, FOR SOLUTION INTRAVENOUS at 20:57

## 2020-02-09 RX ADMIN — SODIUM CHLORIDE 10 ML: 9 INJECTION, SOLUTION INTRAMUSCULAR; INTRAVENOUS; SUBCUTANEOUS at 01:24

## 2020-02-09 RX ADMIN — PANTOPRAZOLE SODIUM 40 MG: 40 INJECTION, POWDER, FOR SOLUTION INTRAVENOUS at 01:24

## 2020-02-09 RX ADMIN — LEVOTHYROXINE SODIUM 125 MCG: 125 TABLET ORAL at 15:32

## 2020-02-09 RX ADMIN — SODIUM CHLORIDE 10 ML: 9 INJECTION, SOLUTION INTRAMUSCULAR; INTRAVENOUS; SUBCUTANEOUS at 07:58

## 2020-02-09 RX ADMIN — ACETAMINOPHEN 650 MG: 325 TABLET, FILM COATED ORAL at 13:10

## 2020-02-09 RX ADMIN — POLYETHYLENE GLYCOL 3350, SODIUM SULFATE ANHYDROUS, SODIUM BICARBONATE, SODIUM CHLORIDE, POTASSIUM CHLORIDE 2000 ML: 236; 22.74; 6.74; 5.86; 2.97 POWDER, FOR SOLUTION ORAL at 17:56

## 2020-02-09 RX ADMIN — SODIUM CHLORIDE, POTASSIUM CHLORIDE, SODIUM LACTATE AND CALCIUM CHLORIDE: 600; 310; 30; 20 INJECTION, SOLUTION INTRAVENOUS at 15:31

## 2020-02-09 ASSESSMENT — ENCOUNTER SYMPTOMS
CONSTIPATION: 0
DIARRHEA: 0
STRIDOR: 0
BLOOD IN STOOL: 1
ANAL BLEEDING: 1
ABDOMINAL DISTENTION: 0
VOMITING: 0
PHOTOPHOBIA: 0
ABDOMINAL PAIN: 0
SHORTNESS OF BREATH: 0

## 2020-02-09 ASSESSMENT — PAIN SCALES - GENERAL
PAINLEVEL_OUTOF10: 5
PAINLEVEL_OUTOF10: 10
PAINLEVEL_OUTOF10: 0
PAINLEVEL_OUTOF10: 0

## 2020-02-09 ASSESSMENT — PAIN DESCRIPTION - LOCATION: LOCATION: FOOT

## 2020-02-09 ASSESSMENT — PAIN DESCRIPTION - PAIN TYPE: TYPE: ACUTE PAIN

## 2020-02-09 ASSESSMENT — PAIN SCALES - WONG BAKER: WONGBAKER_NUMERICALRESPONSE: 0

## 2020-02-09 ASSESSMENT — PAIN DESCRIPTION - ORIENTATION: ORIENTATION: LEFT

## 2020-02-09 NOTE — FLOWSHEET NOTE
02/09/20 1234   Encounter Summary   Services provided to: Patient   Referral/Consult From: [facility]   Continue Visiting   (2/9/20)   Complexity of Encounter Low   Length of Encounter 15 minutes   Spiritual/Shinto   Type Ritual   Intervention Anointing   Sacraments   Sacrament of Sick-Anointing Anointed  (Michele Pritchett 2/9/20)

## 2020-02-09 NOTE — ED NOTES
Bed: 06  Expected date: 2/8/20  Expected time: 8:26 PM  Means of arrival: [facility]  Comments:  Rectal bleeding     [facility], [address]  02/08/20 2043

## 2020-02-09 NOTE — ED NOTES
Report given to Nicolasa Freire RN from [facility]. Admit to room 2040  Report method by phone   The following was reviewed with receiving RN:   Current vital signs:  /69   Pulse 92   Temp 97.7 °F (36.5 °C) (Oral)   Resp 18   Ht 5' 10\" (1.778 m)   Wt 190 lb (86.2 kg)   SpO2 99%   BMI 27.26 kg/m²                MEWS Score: 1     [place] DAVE notified that pt was afib on monitor and second troponin was drawn prior to report. Any medication or safety alerts were reviewed. Any pending diagnostics and notifications were also reviewed, as well as any safety concerns or issues, abnormal labs, abnormal imaging, and abnormal assessment findings. Questions were answered.             Ilya Centeno RN  02/08/20 1646

## 2020-02-09 NOTE — PLAN OF CARE
Problem: Falls - Risk of:  Goal: Will remain free from falls  Description  Will remain free from falls  2/9/2020 1550 by Kem Homans, RN  Outcome: Ongoing  Note:   No falls this shift. Call light within reach and siderails x2. Bed in lowest position. Patient safety maintained. Problem: Falls - Risk of:  Goal: Absence of physical injury  Description  Absence of physical injury  Outcome: Ongoing  Note:   No falls this shift. Call light within reach and siderails x2. Bed in lowest position. Patient safety maintained. Problem: Bowel/Gastric:  Goal: Control of bowel function will improve  Description  Control of bowel function will improve  Outcome: Ongoing     Problem:  Bowel/Gastric:  Goal: Ability to achieve a regular elimination pattern will improve  Description  Ability to achieve a regular elimination pattern will improve  2/9/2020 1550 by Kem Homans, RN  Outcome: Ongoing
scar  Extremities: no cyanosis, clubbing left lower leg plus 2-3 edema  Musculoskeletal: normal range of motion, no joint swelling, deformity or tenderness  Neurologic: no cranial nerve deficit and muscle strength normal    Assessment  Rectal bleeding with mild anemia  Elevated alk phos likely from recent LL leg trauma    Plan  D/w dr Kiko Buckner will plan for egd and colonoscopy tomorrow  Clear diet today then npo after midnight prep ordered  Trend h/h    The Endoscopic procedure was explained to the patient in detail  The prep and NPO were explained  All the Risks, Benefits, and Alternatives were explained  Risk of Bleeding, Perforation and Cardio Respiratory risks were explained  his questions were answered    The patient has verbalized understanding and agreement to this plan. Formal gi consult to follow  . Loco Sherwood, APRN - CNP

## 2020-02-09 NOTE — ED NOTES
Mode of arrival:  [facility]      Residence prior to admit: home      Chief complaint on admission: rectal bleeding, bloody stool  Pt states that he had 4 BMs after dinner, which is abnormal.  Pt states stools were dark red and also bright blood noted. Pt denies any SOB at this time. Pt is AOx4 and uses furniture to ambulate. C= \"Have you ever felt that you should Cut down on your drinking? \"  No  A= \"Have people Annoyed you by criticizing your drinking? \"  No  G= \"Have you ever felt bad or Guilty about your drinking? \"  No  E= \"Have you ever had a drink as an Eye-opener first thing in the morning to steady your nerves or to help a hangover? \"  No      Deferred []      Reason for deferring: N/A    *If yes to two or more: probable alcohol abuse. [address] [facility], RN  02/08/20 2051

## 2020-02-09 NOTE — ED PROVIDER NOTES
EMERGENCY DEPARTMENT ENCOUNTER    Pt Name: Misty Burris  MRN: [id number]  [place] 1940  Date of evaluation: 2/8/20  CHIEF COMPLAINT       Chief Complaint   Patient presents with    Rectal Bleeding     c/o bright and dark red stools     HISTORY OF PRESENT ILLNESS   HPI  HISTORY OF PRESENT ILLNESS:  Past medical history of diabetes presents for chief complaint of bright red blood per rectum. Patient has had 1 day of multiple episodes of bright red blood per rectum. Denies any abdominal pain. No chest pain shortness of breath. No lightheadedness or dizziness. Severity is moderate. No aggravating or relieving factors. Timing is 1 day. Course is intermittent.   Context is no blood thinners  -----------------------  -----------------------  REVIEW OF SYSTEMS  ED Caveat: [none]  Gen:  No fever, no chills  CV: No CP, no palpitations  Resp: No SOB, no respiratory distress  GI: No V, no abd pain  : No dysuria, no increased frequency  Skin: No rash, no purulent lesions  Eyes: No blurry vision, No double vision  MSK: No back pain, no joint pain  Neuro: No HA, no sensation changes  Psych: No SI/HI  -----------------------  -----------------------  ALLERGIES  -per nursing records, reviewed    PAST MEDICAL HISTORY  -See HPI    SOCIAL HISTORY  -No daily drinking, no IV drugs  -----------------------  -----------------------  PHYSICAL EXAM  Gen: Alert, no acute distress  Skin: Warm, no rashes  Head: Normocephalic, atraumatic  Neck: No midline tenderness, no nuchal rigidity  Eye: EOMI, PERRLA, normal conjunctiva  ENT: Mucous membranes moist, no pharyngeal erythema  CV: Normal rate, no rubs  Resp: Respirations unlabored, lungs clear to auscultation  GI: Soft, non distended, no large abdominal masses, non tender  MSK: No midline back pain, no large joint effusions  Neuro: Alert and oriented, no focal neurological deficits observed  Psych: Cooperative, appropriate mood and affect  -----------------------  -----------------------  MEDICAL DECISION MAKING  Differential Diagnosis:  - Consideration is given for    appendicitis, cholecystitis,  diverticulitis, SBO, hernia, urinary tract infection, pyelonephritis, nephrolithiasis, pancreatitis, dissection, ischemia to reproductive organs, STD, ischemic colitis, perforation, intra abdominal bleeding, GI bleed, DKA, ACS,  -  #Impression/Plan:  - Clinically patient's presentation is most consistent with likely upper GI bleed. Will get laboratory testing imaging. Patient is hemodynamically stable and well-appearing at this time. In no acute distress. Did have one bloody bowel movement here. -  ##Reevaluation/Conversations on care:  -No further episodes after the initial one here. Patient refused CT  -   -----------------------  -----------------------  Anna Marie Parekh MD, [facility]  Emergency Medicine Attending  Questions? Please contact my cell phone anytime.   [phone]  *This charting supersedes any ED resident or staff charting and was written using speech recognition software        PASTMEDICAL HISTORY     Past Medical History:   Diagnosis Date    Arthritis     Diabetes mellitus ([facility] [address]     HISTORY OF, NO MEDS CURRENTLY    Hypertension     Hypothyroid     SOB (shortness of breath) on exertion      SURGICAL HISTORY       Past Surgical History:   Procedure Laterality Date    BACK SURGERY      Patient states he isnt sure if he had back surgery    CATARACT REMOVAL WITH IMPLANT      EYE SURGERY      HERNIA REPAIR      JOINT REPLACEMENT      Right hip replacement    LUMBAR EPIDURAL STEROID INJECTION/CAUDAL  3/25/2019          CURRENT MEDICATIONS       Current Discharge Medication List      CONTINUE these medications which have NOT CHANGED    Details   ACCU-CHEK AZEB PLUS strip USE TO TEST 2 TO 3 TIMES DAILY  Qty: 100 strip, Refills: 3    Associated Diagnoses: Type 2 diabetes mellitus without complication, without long-term current use [address]  [phone]          DISCHARGE MEDICATIONS:  Current Discharge Medication List        Sourav Rodriguez MD  Attending Emergency Physician                    Milli Negrete MD  02/08/20 7712       Milli Negrete MD  02/08/20 6668

## 2020-02-09 NOTE — PROGRESS NOTES
Patient arrived to room 2040 via cart. Alert and oriented. Vital signs stable.  Patient in no acute distress

## 2020-02-09 NOTE — PROGRESS NOTES
Patient refused CT scan due to extreme claustrophobia. Dr. Trinidad Palacios was notified & gave the 'ok' to bring the patient back to the ED without scan completion.

## 2020-02-09 NOTE — H&P
97.9 °F (36.6 °C) (Oral)   Resp 16   Ht 5' 10\" (1.778 m)   Wt 190 lb (86.2 kg)   SpO2 96%   BMI 27.26 kg/m²                 Body mass index is 27.26 kg/m². Physical Exam  Constitutional:       Appearance: Normal appearance. HENT:      Head: Normocephalic. Nose: No congestion. Eyes:      General: No scleral icterus. Pupils: Pupils are equal, round, and reactive to light. Cardiovascular:      Rate and Rhythm: Normal rate and regular rhythm. Pulses: Normal pulses. Heart sounds: Normal heart sounds. No murmur. Pulmonary:      Effort: Pulmonary effort is normal.      Breath sounds: Normal breath sounds. Abdominal:      General: Bowel sounds are normal. There is no distension. Palpations: Abdomen is soft. There is no mass. Tenderness: There is no abdominal tenderness. Musculoskeletal:         General: No swelling. Right lower leg: No edema. Left lower leg: Edema present. Skin:     General: Skin is warm. Neurological:      General: No focal deficit present. Mental Status: He is alert and oriented to person, place, and time.                                                            Investigations:     Laboratory Testing:  Recent Results (from the past 24 hour(s))   CBC Auto Differential    Collection Time: 02/08/20  8:45 PM   Result Value Ref Range    WBC 7.3 3.5 - 11.0 k/uL    RBC 4.02 (L) 4.5 - 5.9 m/uL    Hemoglobin 12.6 (L) 13.5 - 17.5 g/dL    Hematocrit 36.7 (L) 41 - 53 %    MCV 91.4 80 - 100 fL    MCH 31.4 26 - 34 pg    MCHC 34.4 31 - 37 g/dL    RDW 13.5 11.5 - 14.9 %    Platelets 210 046 - 069 k/uL    MPV 7.4 6.0 - 12.0 fL    NRBC Automated NOT REPORTED per 100 WBC    Differential Type NOT REPORTED     Seg Neutrophils 68 (H) 36 - 66 %    Lymphocytes 21 (L) 24 - 44 %    Monocytes 8 (H) 1 - 7 %    Eosinophils % 2 0 - 4 %    Basophils 1 0 - 2 %    Immature Granulocytes NOT REPORTED 0 %    Segs Absolute 5.00 1.3 - 9.1 k/uL    Absolute Lymph # 1.50 1.0 - 4.8 k/uL    Absolute Mono # 0.50 0.1 - 1.3 k/uL    Absolute Eos # 0.10 0.0 - 0.4 k/uL    Basophils Absolute 0.10 0.0 - 0.2 k/uL    Absolute Immature Granulocyte NOT REPORTED 0.00 - 0.30 k/uL    WBC Morphology NOT REPORTED     RBC Morphology NOT REPORTED     Platelet Estimate NOT REPORTED    Comprehensive Metabolic Panel w/ Reflex to MG    Collection Time: 02/08/20  8:45 PM   Result Value Ref Range    Glucose 187 (H) 70 - 99 mg/dL    BUN 22 8 - 23 mg/dL    CREATININE 0.78 0.70 - 1.20 mg/dL    Bun/Cre Ratio NOT REPORTED 9 - 20    Calcium 9.9 8.6 - 10.4 mg/dL    Sodium 138 135 - 144 mmol/L    Potassium 4.2 3.7 - 5.3 mmol/L    Chloride 100 98 - 107 mmol/L    CO2 25 20 - 31 mmol/L    Anion Gap 13 9 - 17 mmol/L    Alkaline Phosphatase 197 (H) 40 - 129 U/L    ALT 19 5 - 41 U/L    AST 20 <40 U/L    Total Bilirubin 0.31 0.3 - 1.2 mg/dL    Total Protein 6.5 6.4 - 8.3 g/dL    Alb 3.8 3.5 - 5.2 g/dL    Albumin/Globulin Ratio NOT REPORTED 1.0 - 2.5    GFR Non-African American >60 >60 mL/min    GFR African American >60 >60 mL/min    GFR Comment          GFR Staging NOT REPORTED    Lipase    Collection Time: 02/08/20  8:45 PM   Result Value Ref Range    Lipase 38 13 - 60 U/L   Brain Natriuretic Peptide    Collection Time: 02/08/20  8:45 PM   Result Value Ref Range    Pro- (H) <300 pg/mL    BNP Interpretation Pro-BNP Reference Range:    Troponin    Collection Time: 02/08/20  8:45 PM   Result Value Ref Range    Troponin, High Sensitivity 23 (H) 0 - 22 ng/L    Troponin T NOT REPORTED <0.03 ng/mL    Troponin Interp NOT REPORTED    Protime-INR    Collection Time: 02/08/20  8:45 PM   Result Value Ref Range    Protime 13.6 11.8 - 14.6 sec    INR 1.1    APTT    Collection Time: 02/08/20  8:45 PM   Result Value Ref Range    PTT 31.1 24.0 - 36.0 sec   Lactic Acid    Collection Time: 02/08/20  9:35 PM   Result Value Ref Range    Lactic Acid 2.2 0.5 - 2.2 mmol/L   Troponin    Collection Time: 02/08/20 10:50 PM   Result Value Ref Range sciatica    Type 2 diabetes mellitus without complication, without long-term current use of insulin ([facility] [address]    Hypothyroidism    Essential hypertension  Resolved Problems:    * No resolved hospital problems. *        2. has a past medical history of Arthritis, Diabetes mellitus ([address] Hypertension, Hypothyroid, and SOB (shortness of breath) on exertion. Plans:     Principal Problem:    GI bleed  Active Problems:    Chronic midline low back pain with sciatica    Type 2 diabetes mellitus without complication, without long-term current use of insulin (HCC)    Hypothyroidism    Essential hypertension  Resolved Problems:    * No resolved hospital problems. *      1. Acute GI bleed. Most likely lower GI.   GI on board  Protonix 40 mg IV BID  IV fluids @ 100 mL/hr  Clear liquid diet and bowel prep for colonoscopy once okay with GI    2. Normocytic anemia secondary to acute blood loss. Monitor H&H Q6H. Iron studies. Transfuse if Hb < 7.  3. Resume 125 mcg Synthroid for hypothyroidism  4. Hold Losartan in view of GI bleed. BP stable  5. No chemical anticoagulation. EPC cuffs for DVT prophylaxis        Orders Placed This Encounter   Procedures    CBC Auto Differential    Comprehensive Metabolic Panel w/ Reflex to MG    Lipase    Brain Natriuretic Peptide    Lactic Acid    Troponin    Protime-INR    APTT    Hgb/Hct    Diet NPO Effective Now    Vital signs per unit routine    Notify physician    Notify physician    Up as tolerated    Place intermittent pneumatic compression device    Full Code    Inpatient consult to GI    EKG 12 Lead    Insert peripheral IV    PATIENT STATUS (FROM ED OR OR/PROCEDURAL) Observation          Medications: Allergies:     Allergies   Allergen Reactions    Lisinopril Other (See Comments)     COUGH        Current Meds:   Scheduled Meds:    pantoprazole  40 mg Intravenous BID    And    sodium chloride (PF)  10 mL Intravenous BID    sodium chloride  80 mL Intravenous Once

## 2020-02-09 NOTE — ED NOTES
Pt refused to have CT done d/t claustrophobia. Dr Griffin Schultz made aware.        Vishnu Mcdowell RN  02/08/20 5800

## 2020-02-09 NOTE — PROGRESS NOTES
Writer laws serve [facility] NP regarding patient diet. GI wants to see patient prior to changing diet.

## 2020-02-09 NOTE — FLOWSHEET NOTE
02/09/20 1218   Encounter Summary   Services provided to: Patient   Referral/Consult From: [facility]   (2/9/20V)   Volunteer Visit Yes   Complexity of Encounter Low   Length of Encounter 15 minutes   Spiritual/Latter-day   Type Spiritual support   Intervention Prayer   Sacraments   Communion Patient is currently unable

## 2020-02-09 NOTE — PROGRESS NOTES
Patient wanted his wallet locked up in the room. Writer counted money with patient equaling $423 with credit cards and locked up nurse .

## 2020-02-10 ENCOUNTER — ANESTHESIA (OUTPATIENT)
Dept: ENDOSCOPY | Age: 80
End: 2020-02-10
Payer: MEDICARE

## 2020-02-10 ENCOUNTER — ANESTHESIA EVENT (OUTPATIENT)
Dept: ENDOSCOPY | Age: 80
End: 2020-02-10
Payer: MEDICARE

## 2020-02-10 VITALS
TEMPERATURE: 97.7 F | SYSTOLIC BLOOD PRESSURE: 132 MMHG | WEIGHT: 190 LBS | HEIGHT: 70 IN | DIASTOLIC BLOOD PRESSURE: 69 MMHG | HEART RATE: 76 BPM | BODY MASS INDEX: 27.2 KG/M2 | OXYGEN SATURATION: 100 % | RESPIRATION RATE: 18 BRPM

## 2020-02-10 VITALS
SYSTOLIC BLOOD PRESSURE: 93 MMHG | RESPIRATION RATE: 20 BRPM | TEMPERATURE: 97.7 F | OXYGEN SATURATION: 100 % | DIASTOLIC BLOOD PRESSURE: 52 MMHG

## 2020-02-10 LAB
ANION GAP SERPL CALCULATED.3IONS-SCNC: 11 MMOL/L (ref 9–17)
BUN BLDV-MCNC: 12 MG/DL (ref 8–23)
BUN/CREAT BLD: ABNORMAL (ref 9–20)
CALCIUM SERPL-MCNC: 9.1 MG/DL (ref 8.6–10.4)
CHLORIDE BLD-SCNC: 104 MMOL/L (ref 98–107)
CO2: 22 MMOL/L (ref 20–31)
CREAT SERPL-MCNC: 0.5 MG/DL (ref 0.7–1.2)
EKG ATRIAL RATE: 441 BPM
EKG Q-T INTERVAL: 348 MS
EKG QRS DURATION: 98 MS
EKG QTC CALCULATION (BAZETT): 432 MS
EKG R AXIS: -42 DEGREES
EKG T AXIS: 114 DEGREES
EKG VENTRICULAR RATE: 93 BPM
GFR AFRICAN AMERICAN: >60 ML/MIN
GFR NON-AFRICAN AMERICAN: >60 ML/MIN
GFR SERPL CREATININE-BSD FRML MDRD: ABNORMAL ML/MIN/{1.73_M2}
GFR SERPL CREATININE-BSD FRML MDRD: ABNORMAL ML/MIN/{1.73_M2}
GLUCOSE BLD-MCNC: 123 MG/DL (ref 70–99)
HCT VFR BLD CALC: 33.3 % (ref 41–53)
HCT VFR BLD CALC: 33.7 % (ref 41–53)
HCT VFR BLD CALC: 33.8 % (ref 41–53)
HEMOGLOBIN: 11.6 G/DL (ref 13.5–17.5)
POTASSIUM SERPL-SCNC: 3.8 MMOL/L (ref 3.7–5.3)
SODIUM BLD-SCNC: 137 MMOL/L (ref 135–144)

## 2020-02-10 PROCEDURE — 3609010300 HC COLONOSCOPY W/BIOPSY SINGLE/MULTIPLE: Performed by: INTERNAL MEDICINE

## 2020-02-10 PROCEDURE — 2580000003 HC RX 258: Performed by: INTERNAL MEDICINE

## 2020-02-10 PROCEDURE — C9113 INJ PANTOPRAZOLE SODIUM, VIA: HCPCS | Performed by: INTERNAL MEDICINE

## 2020-02-10 PROCEDURE — 43239 EGD BIOPSY SINGLE/MULTIPLE: CPT | Performed by: INTERNAL MEDICINE

## 2020-02-10 PROCEDURE — 3700000001 HC ADD 15 MINUTES (ANESTHESIA): Performed by: INTERNAL MEDICINE

## 2020-02-10 PROCEDURE — 93010 ELECTROCARDIOGRAM REPORT: CPT | Performed by: INTERNAL MEDICINE

## 2020-02-10 PROCEDURE — 3609012400 HC EGD TRANSORAL BIOPSY SINGLE/MULTIPLE: Performed by: INTERNAL MEDICINE

## 2020-02-10 PROCEDURE — 85018 HEMOGLOBIN: CPT

## 2020-02-10 PROCEDURE — 45378 DIAGNOSTIC COLONOSCOPY: CPT | Performed by: INTERNAL MEDICINE

## 2020-02-10 PROCEDURE — 6360000002 HC RX W HCPCS: Performed by: INTERNAL MEDICINE

## 2020-02-10 PROCEDURE — 2709999900 HC NON-CHARGEABLE SUPPLY: Performed by: INTERNAL MEDICINE

## 2020-02-10 PROCEDURE — 36415 COLL VENOUS BLD VENIPUNCTURE: CPT

## 2020-02-10 PROCEDURE — 6360000002 HC RX W HCPCS: Performed by: NURSE ANESTHETIST, CERTIFIED REGISTERED

## 2020-02-10 PROCEDURE — 85014 HEMATOCRIT: CPT

## 2020-02-10 PROCEDURE — 88305 TISSUE EXAM BY PATHOLOGIST: CPT

## 2020-02-10 PROCEDURE — 99217 PR OBSERVATION CARE DISCHARGE MANAGEMENT: CPT | Performed by: INTERNAL MEDICINE

## 2020-02-10 PROCEDURE — G0378 HOSPITAL OBSERVATION PER HR: HCPCS

## 2020-02-10 PROCEDURE — 7100000000 HC PACU RECOVERY - FIRST 15 MIN: Performed by: INTERNAL MEDICINE

## 2020-02-10 PROCEDURE — 7100000001 HC PACU RECOVERY - ADDTL 15 MIN: Performed by: INTERNAL MEDICINE

## 2020-02-10 PROCEDURE — 2500000003 HC RX 250 WO HCPCS: Performed by: NURSE ANESTHETIST, CERTIFIED REGISTERED

## 2020-02-10 PROCEDURE — 80048 BASIC METABOLIC PNL TOTAL CA: CPT

## 2020-02-10 PROCEDURE — 96376 TX/PRO/DX INJ SAME DRUG ADON: CPT

## 2020-02-10 PROCEDURE — 3700000000 HC ANESTHESIA ATTENDED CARE: Performed by: INTERNAL MEDICINE

## 2020-02-10 RX ORDER — LIDOCAINE HYDROCHLORIDE 20 MG/ML
INJECTION, SOLUTION EPIDURAL; INFILTRATION; INTRACAUDAL; PERINEURAL PRN
Status: DISCONTINUED | OUTPATIENT
Start: 2020-02-10 | End: 2020-02-10 | Stop reason: SDUPTHER

## 2020-02-10 RX ORDER — PROPOFOL 10 MG/ML
INJECTION, EMULSION INTRAVENOUS PRN
Status: DISCONTINUED | OUTPATIENT
Start: 2020-02-10 | End: 2020-02-10 | Stop reason: SDUPTHER

## 2020-02-10 RX ADMIN — PANTOPRAZOLE SODIUM 40 MG: 40 INJECTION, POWDER, FOR SOLUTION INTRAVENOUS at 09:52

## 2020-02-10 RX ADMIN — Medication 10 ML: at 09:55

## 2020-02-10 RX ADMIN — LIDOCAINE HYDROCHLORIDE 60 MG: 20 INJECTION, SOLUTION EPIDURAL; INFILTRATION; INTRACAUDAL; PERINEURAL at 11:11

## 2020-02-10 RX ADMIN — PROPOFOL 300 MG: 10 INJECTION, EMULSION INTRAVENOUS at 11:11

## 2020-02-10 RX ADMIN — SODIUM CHLORIDE 10 ML: 9 INJECTION, SOLUTION INTRAMUSCULAR; INTRAVENOUS; SUBCUTANEOUS at 09:52

## 2020-02-10 ASSESSMENT — PULMONARY FUNCTION TESTS
PIF_VALUE: 1
PIF_VALUE: 0
PIF_VALUE: 1
PIF_VALUE: 3
PIF_VALUE: 1
PIF_VALUE: 0
PIF_VALUE: 1
PIF_VALUE: 0
PIF_VALUE: 0
PIF_VALUE: 1
PIF_VALUE: 0
PIF_VALUE: 1
PIF_VALUE: 0
PIF_VALUE: 0
PIF_VALUE: 1

## 2020-02-10 ASSESSMENT — ENCOUNTER SYMPTOMS
STRIDOR: 0
SHORTNESS OF BREATH: 1

## 2020-02-10 ASSESSMENT — PAIN SCALES - GENERAL
PAINLEVEL_OUTOF10: 0

## 2020-02-10 NOTE — DISCHARGE SUMMARY
[place] [address] Internal Medicine    Discharge Summary     Patient ID: Krysta Eaton  :  1940   MRN: [id number]     ACCOUNT:  [de-identified]   Patient's PCP: Natalia Alexander MD  Admit Date: 2020   Discharge Date: 2/10/2020    Length of Stay: 0  Code Status:  Full Code  Admitting Physician: Natalia Alexander MD  Discharge Physician: Don Pepper MD     Active Discharge Diagnoses:     Primary Problem  GI bleed      [facility] Problems    Diagnosis Date Noted    Essential hypertension [I10] 2020    Rectal bleeding [K62.5]     Elevated alkaline phosphatase level [R74.8]     Anemia [D64.9]     GI bleed [K92.2] 2020    Type 2 diabetes mellitus without complication, without long-term current use of insulin ([facility] [address] [E11.9] 10/05/2017    Hypothyroidism [E03.9] 10/05/2017    Chronic midline low back pain with sciatica [M54.40, G89.29] 10/05/2017       Admission Condition:  fair     Discharged Condition: fair    Hospital Stay:     Hospital Course:  Krysta Eaton is a 78 y.o. male who was admitted for the management of GI bleed , presented to ER with Rectal Bleeding (c/o bright and dark red stools)  78year-old gentleman who is on aspirin 325 admitted with some dark red blood in the stool he had EGD done which showed mild gastritis colonoscopy was negative hemoglobin was stable at 11 the bleeding could be from the small bowel plan for outpatient capsule endoscopy          Significant therapeutic interventions:     Significant Diagnostic Studies:   Labs / Micro:        ,     Radiology:    Xr Knee Left (1-2 Views)    Result Date: 2020  AP and lateral left knee 16 degree varus deformity no progressive change with respect to  significant callus formation        Consultations:    Consults:     Final Specialist Recommendations/Findings:   IP CONSULT TO GI      The patient was seen and examined on day of discharge and this discharge summary is in conjunction with any daily progress note from day of discharge. Discharge plan:     Disposition: Home    Physician Follow Up:     Odalis Malagon MD   [facility] [address] [place] [address]  [phone]             Requiring Further Evaluation/Follow Up POST HOSPITALIZATION/Incidental Findings:    Diet: cardiac diet    Activity: As tolerated    Instructions to Patient:     Discharge Medications:      Medication List      CONTINUE taking these medications    Accu-Chek Tona Plus strip  Generic drug:  blood glucose test strips  USE TO TEST 2 TO 3 TIMES DAILY     B-D ASSURE BPM/AUTO ARM CUFF Misc  1 Device by Does not apply route daily     docusate sodium 100 MG capsule  Commonly known as:  COLACE     famotidine 20 MG tablet  Commonly known as:  PEPCID  TAKE 1 TABLET BY MOUTH TWICE A DAY     fish oil 1000 MG Caps     furosemide 20 MG tablet  Commonly known as:  Lasix  Take 1 tablet by mouth daily     latanoprost 0.005 % ophthalmic solution  Commonly known as:  XALATAN     levothyroxine 125 MCG tablet  Commonly known as:  SYNTHROID  Take 1 tablet by mouth Daily     losartan 25 MG tablet  Commonly known as:  COZAAR  Take 1 tablet by mouth daily     vitamin B-12 1000 MCG tablet  Commonly known as:  CYANOCOBALAMIN     vitamin D3 25 MCG (1000 UT) Tabs     vitamin E 1000 units capsule        STOP taking these medications    aspirin 325 MG tablet            Time Spent on discharge is  35 mins in patient examination, evaluation, counseling as well as medication reconciliation, prescriptions for required medications, discharge plan and follow up. Electronically signed by   Jacky Andres MD  2/10/2020  3:18 PM      Thank you Dr. Odalis Malagon MD for the opportunity to be involved in this patient's care.

## 2020-02-10 NOTE — DISCHARGE INSTR - DIET

## 2020-02-10 NOTE — CONSULTS
by mouth every 6 hours as needed for Pain       Cholecalciferol (VITAMIN D3) 1000 units TABS Take 1,000 Units by mouth daily       vitamin E 1000 units capsule Take 1,000 Units by mouth daily      docusate sodium (COLACE) 100 MG capsule Take 100 mg by mouth daily       vitamin B-12 (CYANOCOBALAMIN) 1000 MCG tablet Take 1,000 mcg by mouth daily      ACCU-CHEK AZEB PLUS strip USE TO TEST 2 TO 3 TIMES DAILY 100 strip 3    furosemide (LASIX) 20 MG tablet Take 1 tablet by mouth daily 60 tablet 3    losartan (COZAAR) 25 MG tablet Take 1 tablet by mouth daily 90 tablet 1    Blood Pressure Monitoring (B-D ASSURE BPM/AUTO ARM CUFF) MISC 1 Device by Does not apply route daily 1 each 0    latanoprost (XALATAN) 0.005 % ophthalmic solution Place 1 drop into both eyes daily         Patient   Does Use ASA, NSAID Yes  Allergies  Allergies   Allergen Reactions    Lisinopril Other (See Comments)     COUGH         Social   Social History     Tobacco Use    Smoking status: Former Smoker     Packs/day: 1.50     Years: 40.00     Pack years: 60.00     Types: Cigarettes     Last attempt to quit: 1988     Years since quittin.3    Smokeless tobacco: Never Used   Substance Use Topics    Alcohol use: No        PSYCH HISTORY:  Depression No  Anxiety No  Suicide No       Family History   Problem Relation Age of Onset    No Known Problems Mother     No Known Problems Father     No Known Problems Brother       No family history of colon cancer, Crohn's disease, or ulcerative colitis. Review of Systems  Constitutional: negative  Eyes: negative  Ears, nose, mouth, throat, and face: negative  Respiratory: negative  Cardiovascular: negative  Gastrointestinal: negative  Genitourinary:negative  Integument/breast: negative  Hematologic/lymphatic: negative  Musculoskeletal:negative  Endocrine: negative           Physical Exam  Blood pressure 126/73, pulse 87, temperature 97.7 °F (36.5 °C), temperature source Oral, resp.  rate 18, height 5' 10\" (1.778 m), weight 190 lb (86.2 kg), SpO2 99 %. General Appearance: alert and oriented to person, place and time, well-developed and well-nourished, in no acute distress  Skin: warm and dry, no rash or erythema  Head: normocephalic and atraumatic  Eyes: pupils equal, round, and reactive to light, extraocular eye movements intact, conjunctivae normal  ENT: hearing grossly normal bilaterally  Neck: neck supple and non tender without mass, no thyromegaly or thyroid nodules, no cervical lymphadenopathy   Pulmonary/Chest: clear to auscultation bilaterally- no wheezes, rales or rhonchi, normal air movement, no respiratory distress  Cardiovascular: normal rate, regular rhythm, normal S1 and S2, no murmurs, rubs, clicks or gallops, distal pulses intact, no carotid bruits  Abdomen: soft, non-tender, non-distended, normal bowel sounds, no masses or organomegaly  Extremities: no cyanosis, clubbing or edema  Musculoskeletal: Left lower leg swelling. Neurologic: no cranial nerve deficit and muscle strength normal    Data Review:    Recent Labs     02/08/20 2045 02/09/20  1800 02/10/20  0016 02/10/20  0625   WBC 7.3  --   --   --   --    HGB 12.6*   < > 11.3* 11.6* 11.6*   HCT 36.7*   < > 31.4* 33.3* 33.8*   MCV 91.4  --   --   --   --      --   --   --   --     < > = values in this interval not displayed. Recent Labs     02/08/20  2045 02/10/20  0625    137   K 4.2 3.8    104   CO2 25 22   BUN 22 12   CREATININE 0.78 0.50*     Recent Labs     02/08/20 2045   AST 20   ALT 19   BILITOT 0.31   ALKPHOS 197*     Recent Labs     02/08/20 2045   LIPASE 38     Recent Labs     02/08/20 2045   PROTIME 13.6   INR 1.1     No results for input(s): PTT in the last 72 hours. No results for input(s): OCCULTBLD in the last 72 hours.   CEA:  No results found for: CEA  Ca 125:  No results found for:   Ca 19-9:  No results found for:   Ca 15-3:  No results found for:   AFP:  No

## 2020-02-10 NOTE — ANESTHESIA POSTPROCEDURE EVALUATION
POST- ANESTHESIA EVALUATION       Pt Name: Preston Curtis  MRN: [id number]  YOB: 1940  Date of evaluation: 2/10/2020  Time:  12:32 PM      /69   Pulse 84   Temp 97.3 °F (36.3 °C)   Resp 18   Ht 5' 10\" (1.778 m)   Wt 190 lb (86.2 kg)   SpO2 100%   BMI 27.26 kg/m²      Consciousness Level  Awake  Cardiopulmonary Status  Stable  Pain Adequately Treated YES  Nausea / Vomiting  NO  Adequate Hydration  YES  Anesthesia Related Complications NONE      Electronically signed by Krystian Poe MD on 2/10/2020 at 12:32 PM       Department of Anesthesiology  Postprocedure Note    Patient: Preston Curtis  MRN: [id number]  YOB: 1940  Date of evaluation: 2/10/2020  Time:  12:32 PM     Procedure Summary     Date:  02/10/20 Room / Location:  [address] [place] [address] 04 / [facility] ENDO    Anesthesia Start:  1107 Anesthesia Stop:  6953    Procedures:       EGD (N/A Esophagus)      COLONOSCOPY DIAGNOSTIC (N/A ) Diagnosis:  (RECTAL BLEED)    Surgeon:  Ryan Artis MD Responsible Provider:  Krystian Poe MD    Anesthesia Type:  MAC ASA Status:  2          Anesthesia Type: MAC    Robe Phase I: Robe Score: 10    Robe Phase II:      Last vitals: Reviewed and per EMR flowsheets.        Anesthesia Post Evaluation

## 2020-02-10 NOTE — OP NOTE
PROCEDURE NOTE    DATE OF PROCEDURE: 2/10/2020    SURGEON: Safia Loredo MD  Facility : [facility]  ASSISTANT: None  Anesthesia: MAC  PREOPERATIVE DIAGNOSIS:   Rectal bleeding    POSTOPERATIVE DIAGNOSIS: as described below    OPERATION: Total colonoscopy     ANESTHESIA: Moderate Sedation    ESTIMATED BLOOD LOSS: less than 50     COMPLICATIONS: None. SPECIMENS:  Was Not Obtained    HISTORY: The patient is a 78y.o. year old male with history of above preop diagnosis. I recommended colonoscopy with possible biopsy or polypectomy and I explained the risk, benefits, expected outcome, and alternatives to the procedure. Risks included but are not limited to bleeding, infection, respiratory distress, hypotension, and perforation of the colon and possibility of missing a lesion. The patient understands and is in agreement. PROCEDURE: The patient was given IV conscious sedation. The patient's SPO2 remained above 90% throughout the procedure. The colonoscope was inserted per rectum and advanced under direct vision to the cecum without difficulty. Post sedation note : The patient's SPO2 remained above 90% throughout the procedure. the vital signs remained stable , and no immediate complication form the procedure noted, patient will be ready for d/c when criteria is met . The prep was poor.   Large amount of stool within the entire colon and the ileum    Findings:  Terminal ileum: abnormal: We intubated the ileum and examined at least 40 cm above the ileocecal valve melenic/melenic stool was seen up , indicating bleeding might be from the small bowel     Cecum/Ascending colon: abnormal: Poor prep, diverticulum    Transverse colon: abnormal: Poor prep, diverticulum    Descending/Sigmoid colon: abnormal: Poor prep, severe diverticulum    Rectum/Anus: examined in normal and retroflexed positions and was normal    Withdrawal Time was (minutes): 10    The colon was decompressed and the scope was
Biopsies  2.  PPI  3. colonoscopy     Electronically signed by Kaleigh Bates MD  on 2/10/2020 at 11:19 AM

## 2020-02-10 NOTE — FLOWSHEET NOTE
SC visit with patient and his niece Socorro Torres. He said he has advance directives but didn't want to discuss much about them. He is anxious as he is waiting for d/c and wants to get home. 02/10/20 1619   Encounter Summary   Services provided to: Patient and family together   Referral/Consult From: [facility] Family members   Continue Visiting   (2-10-20)   Complexity of Encounter Moderate   Length of Encounter 15 minutes   Advance Care Planning Yes   Routine   Type Initial   Assessment Approachable; Anxious   Intervention Active listening;Explored feelings, thoughts, concerns;Explored coping resources; [place];Sustaining presence/ Ministry of presence; Discussed illness/injury and it's impact; Discussed meaning/purpose   Outcome Expressed gratitude;Engaged in conversation;Expressed feelings/needs/concerns;Receptive   Advance Directives (For Healthcare)   Healthcare Directive Yes, patient has an advance directive for healthcare treatment   Type of Healthcare Directive Durable power of  for health care;Living will   Copy in Chart No, copy requested from family

## 2020-02-10 NOTE — FLOWSHEET NOTE
02/10/20 1238   Encounter Summary   Services provided to: Patient   Referral/Consult From: Other disciplines   Complexity of Encounter Low   Length of Encounter 15 minutes   Routine   Type Initial   Assessment Unable to respond   Intervention Prayer   Outcome Did not respond

## 2020-02-10 NOTE — ANESTHESIA PRE PROCEDURE
Department of Anesthesiology  Preprocedure Note       Name:  Shekhar Vera   Age:  78 y.o.  :  1940                                          MRN:  [id number]         Date:  2/10/2020      Surgeon: Anthony Brito):  Nicanor Woodward MD    Procedure: EGD (N/A Esophagus)  COLONOSCOPY DIAGNOSTIC (N/A )    Medications prior to admission:   Prior to Admission medications    Medication Sig Start Date End Date Taking?  Authorizing Provider   famotidine (PEPCID) 20 MG tablet TAKE 1 TABLET BY MOUTH TWICE A DAY 10/29/19  Yes Ceferino Munguia MD   levothyroxine (SYNTHROID) 125 MCG tablet Take 1 tablet by mouth Daily 3/13/19  Yes Ceferino Munguia MD   Omega-3 Fatty Acids (FISH OIL) 1000 MG CAPS Take 1,000 mg by mouth daily    Yes Historical Provider, MD   aspirin 325 MG tablet Take 325 mg by mouth every 6 hours as needed for Pain    Yes Historical Provider, MD   Cholecalciferol (VITAMIN D3) 1000 units TABS Take 1,000 Units by mouth daily    Yes Historical Provider, MD   vitamin E 1000 units capsule Take 1,000 Units by mouth daily   Yes Historical Provider, MD   docusate sodium (COLACE) 100 MG capsule Take 100 mg by mouth daily    Yes Historical Provider, MD   vitamin B-12 (CYANOCOBALAMIN) 1000 MCG tablet Take 1,000 mcg by mouth daily   Yes Historical Provider, MD   ACCU-CHEK AZEB PLUS strip USE TO TEST 2 TO 3 TIMES DAILY 20   Ceferino Munguia MD   furosemide (LASIX) 20 MG tablet Take 1 tablet by mouth daily 19   Ceferino Munguia MD   losartan (COZAAR) 25 MG tablet Take 1 tablet by mouth daily 19   Ceferino Munguia MD   Blood Pressure Monitoring (B-D ASSURE BPM/AUTO ARM CUFF) MISC 1 Device by Does not apply route daily 18   Ceferino Munguia MD   latanoprost (XALATAN) 0.005 % ophthalmic solution Place 1 drop into both eyes daily    Historical Provider, MD       Current medications:    Current Facility-Administered Medications   Medication Dose Route Frequency Provider Last Rate Last Dose    [MAR Hold] pantoprazole (PROTONIX) lateral meniscus of left knee as current injury S83.272A    GI bleed K92.2    Essential hypertension I10    Rectal bleeding K62.5    Elevated alkaline phosphatase level R74.8    Anemia D64.9       Past Medical History:        Diagnosis Date    Arthritis     Diabetes mellitus ([address]     HISTORY OF, NO MEDS CURRENTLY    Hypertension     Hypothyroid     SOB (shortness of breath) on exertion        Past Surgical History:        Procedure Laterality Date    BACK SURGERY      Patient states he isnt sure if he had back surgery    CATARACT REMOVAL WITH IMPLANT      EYE SURGERY      HERNIA REPAIR      JOINT REPLACEMENT      Right hip replacement    LUMBAR EPIDURAL STEROID INJECTION/CAUDAL  3/25/2019            Social History:    Social History     Tobacco Use    Smoking status: Former Smoker     Packs/day: 1.50     Years: 40.00     Pack years: 60.00     Types: Cigarettes     Last attempt to quit: 1988     Years since quittin.3    Smokeless tobacco: Never Used   Substance Use Topics    Alcohol use:  No                                Counseling given: Not Answered      Vital Signs (Current):   Vitals:    20 1901 02/10/20 0714 02/10/20 1008 02/10/20 1041   BP: (!) 146/91 126/73 133/70 115/64   Pulse: 88 87 78 89   Resp: 18 18 16 18   Temp: 97.7 °F (36.5 °C) 97.7 °F (36.5 °C) 97.4 °F (36.3 °C) 97 °F (36.1 °C)   TempSrc: Oral Oral  Oral   SpO2: 100% 99% 99% 95%   Weight:       Height:                                                  BP Readings from Last 3 Encounters:   02/10/20 115/64   19 132/82   19 (!) 159/85       NPO Status: Time of last liquid consumption:                         Time of last solid consumption:                         Date of last liquid consumption: 20                        Date of last solid food consumption: 20    BMI:   Wt Readings from Last 3 Encounters:   20 190 lb (86.2 kg)   19 190 lb (86.2 kg)   19 192 lb (87.1 kg) Body mass index is 27.26 kg/m². CBC:   Lab Results   Component Value Date    WBC 7.3 02/08/2020    RBC 4.02 02/08/2020    HGB 11.6 02/10/2020    HCT 33.8 02/10/2020    MCV 91.4 02/08/2020    RDW 13.5 02/08/2020     02/08/2020       CMP:   Lab Results   Component Value Date     02/10/2020    K 3.8 02/10/2020     02/10/2020    CO2 22 02/10/2020    BUN 12 02/10/2020    CREATININE 0.50 02/10/2020    GFRAA >60 02/10/2020    LABGLOM >60 02/10/2020    GLUCOSE 123 02/10/2020    PROT 6.5 02/08/2020    CALCIUM 9.1 02/10/2020    BILITOT 0.31 02/08/2020    ALKPHOS 197 02/08/2020    AST 20 02/08/2020    ALT 19 02/08/2020       POC Tests: No results for input(s): POCGLU, POCNA, POCK, POCCL, POCBUN, POCHEMO, POCHCT in the last 72 hours.     Coags:   Lab Results   Component Value Date    PROTIME 13.6 02/08/2020    INR 1.1 02/08/2020    APTT 31.1 02/08/2020       HCG (If Applicable): No results found for: PREGTESTUR, PREGSERUM, HCG, HCGQUANT     ABGs: No results found for: PHART, PO2ART, FNA8CGI, CSE1SEW, BEART, X8YOJRQX     Type & Screen (If Applicable):  No results found for: LABABO, [address]    Anesthesia Evaluation  Patient summary reviewed and Nursing notes reviewed  Airway: Mallampati: II  TM distance: >3 FB   Neck ROM: full  Mouth opening: > = 3 FB Dental: normal exam         Pulmonary: breath sounds clear to auscultation  (+) shortness of breath: chronic,      (-) rhonchi, wheezes, rales and stridor                           Cardiovascular:    (+) hypertension: no interval change,     (-) murmur, weak pulses,  friction rub, systolic click, carotid bruit,  JVD and peripheral edema      Rhythm: regular  Rate: normal                    Neuro/Psych:   (+) neuromuscular disease:,             GI/Hepatic/Renal:             Endo/Other:    (+) DiabetesType II DM, , hypothyroidism::., .                 Abdominal:           Vascular:                                        Anesthesia Plan      MAC     ASA 2 Induction: intravenous. Anesthetic plan and risks discussed with patient. Plan discussed with CRNA.                   Nita Dumont MD   2/10/2020

## 2020-02-11 LAB — SURGICAL PATHOLOGY REPORT: NORMAL

## 2020-02-11 RX ORDER — LOSARTAN POTASSIUM 25 MG/1
TABLET ORAL
Qty: 90 TABLET | Refills: 1 | Status: SHIPPED | OUTPATIENT
Start: 2020-02-11 | End: 2021-02-23

## 2020-02-12 ENCOUNTER — TELEPHONE (OUTPATIENT)
Dept: INTERNAL MEDICINE CLINIC | Age: 80
End: 2020-02-12

## 2020-02-12 NOTE — TELEPHONE ENCOUNTER
[facility] Transitions Initial Follow Up Call    Outreach made within 2 business days of discharge: Yes    Patient: Adeola Lentz Patient : 1940   MRN: [id number]  Reason for Admission: There are no discharge diagnoses documented for the most recent discharge. Discharge Date: 2/10/20       Spoke with: Chapo Grullon     Discharge department/facility: [facility]     TCM Interactive Patient Contact:  Was patient able to fill all prescriptions: No:   Was patient instructed to bring all medications to the follow-up visit: Yes  Is patient taking all medications as directed in the discharge summary?  Yes  Does patient understand their discharge instructions: Yes  Does patient have questions or concerns that need addressed prior to 7-14 day follow up office visit: yes -     Scheduled appointment with PCP within 7-14 days    Follow Up  Future Appointments   Date Time Provider Ally Treadwell   2020 11:30 AM [address]   2020  1:50 PM Thressa Gowers, MD [address] [place] [address]

## 2020-02-13 RX ORDER — FAMOTIDINE 20 MG/1
TABLET, FILM COATED ORAL
Qty: 180 TABLET | Refills: 1 | Status: SHIPPED | OUTPATIENT
Start: 2020-02-13 | End: 2021-02-23

## 2020-02-27 ENCOUNTER — OFFICE VISIT (OUTPATIENT)
Dept: ORTHOPEDIC SURGERY | Age: 80
End: 2020-02-27

## 2020-02-27 PROCEDURE — 99024 POSTOP FOLLOW-UP VISIT: CPT | Performed by: ORTHOPAEDIC SURGERY

## 2020-02-27 NOTE — PROGRESS NOTES
mouth Daily, Disp: 90 tablet, Rfl: 3    Blood Pressure Monitoring (B-D ASSURE BPM/AUTO ARM CUFF) MISC, 1 Device by Does not apply route daily, Disp: 1 each, Rfl: 0    Omega-3 Fatty Acids (FISH OIL) 1000 MG CAPS, Take 1,000 mg by mouth daily , Disp: , Rfl:     latanoprost (XALATAN) 0.005 % ophthalmic solution, Place 1 drop into both eyes daily, Disp: , Rfl:     Cholecalciferol (VITAMIN D3) 1000 units TABS, Take 1,000 Units by mouth daily , Disp: , Rfl:     vitamin E 1000 units capsule, Take 1,000 Units by mouth daily, Disp: , Rfl:     docusate sodium (COLACE) 100 MG capsule, Take 100 mg by mouth daily , Disp: , Rfl:     vitamin B-12 (CYANOCOBALAMIN) 1000 MCG tablet, Take 1,000 mcg by mouth daily, Disp: , Rfl:   Allergies   Allergen Reactions    Lisinopril Other (See Comments)     COUGH      Social History     Socioeconomic History    Marital status: Single     Spouse name: Not on file    Number of children: 0    Years of education: Not on file    Highest education level: Not on file   Occupational History    Not on file   Social Needs    Financial resource strain: Not on file    Food insecurity:     Worry: Not on file     Inability: Not on file    Transportation needs:     Medical: Not on file     Non-medical: Not on file   Tobacco Use    Smoking status: Former Smoker     Packs/day: 1.50     Years: 40.00     Pack years: 60.00     Types: Cigarettes     Last attempt to quit: 1988     Years since quittin.4    Smokeless tobacco: Never Used   Substance and Sexual Activity    Alcohol use: No    Drug use: No    Sexual activity: Not on file   Lifestyle    Physical activity:     Days per week: Not on file     Minutes per session: Not on file    Stress: Not on file   Relationships    Social connections:     Talks on phone: Not on file     Gets together: Not on file     Attends Gnosticism service: Not on file     Active member of club or organization: Not on file     Attends meetings of clubs or organizations: Not on file     Relationship status: Not on file    Intimate partner violence:     Fear of current or ex partner: Not on file     Emotionally abused: Not on file     Physically abused: Not on file     Forced sexual activity: Not on file   Other Topics Concern    Not on file   Social History Narrative    Not on file     Past Medical History:   Diagnosis Date    Arthritis     Diabetes mellitus ([address]     HISTORY OF, NO MEDS CURRENTLY    Hypertension     Hypothyroid     SOB (shortness of breath) on exertion      Past Surgical History:   Procedure Laterality Date    BACK SURGERY      Patient states he isnt sure if he had back surgery    CATARACT REMOVAL WITH IMPLANT      COLONOSCOPY N/A 2/10/2020    COLONOSCOPY WITH BIOPSY performed by [facility]      Right hip replacement    LUMBAR EPIDURAL STEROID INJECTION/CAUDAL  3/25/2019         UPPER GASTROINTESTINAL ENDOSCOPY N/A 2/10/2020    EGD BIOPSY performed by Sandra Lawrence MD at [facility]     Family History   Problem Relation Age of Onset    No Known Problems Mother     No Known Problems Father     No Known Problems Brother           Labs and Imaging:     XR taken today:  Xr Knee Left (1-2 Views)    Result Date: 2/27/2020  AP and lateral left knee reveal a varus deformity impacted tibial plateau fracture no substantial change with respect to prior x-ray    Assessment and Plan:  1. Closed fracture of left tibial plateau with routine healing    2. Acute pain of left knee            78 y.o. male who presents for 4 week follow up of left knee pain. XR of the left knee was taken on today and reviewed by me. Patient notes 50% improvement from pain and he is now using a cane to walk instead of a walker. Follow up 4 weeks.            Scribe Attestation:  I, Stan East, personally scribed the services dictated to me by Dr. Linda Thomason in this documentation on this date

## 2020-03-06 ENCOUNTER — NURSE TRIAGE (OUTPATIENT)
Dept: OTHER | Facility: CLINIC | Age: 80
End: 2020-03-06

## 2020-03-06 NOTE — TELEPHONE ENCOUNTER
Reason for Disposition   Numbness or tingling in one or both feet is a chronic symptom (recurrent or ongoing problem lasting > 4 weeks)    Protocols used: NEUROLOGIC DEFICIT-ADULT-[place]    Received call from [facility]. Pt calling c/o ongoing bilateral feet tingling for the past 2-3 years. He states he has tingling when walking, feels like \"mush\" on the bottom of his feet. Needs to use a cane when walking because he is afraid of falling. Recommend pt be seen in office within 3 days. Call back if any new or worsening symptoms. Call soft transferred to [address] [place] [address] to schedule appointment. Please do not respond to the triage nurse through this encounter. Any subsequent communication should be directly with the patient.

## 2020-04-20 ENCOUNTER — OFFICE VISIT (OUTPATIENT)
Dept: PODIATRY | Age: 80
End: 2020-04-20
Payer: MEDICARE

## 2020-04-20 VITALS — BODY MASS INDEX: 28.89 KG/M2 | HEIGHT: 68 IN

## 2020-04-20 PROCEDURE — 99999 PR OFFICE/OUTPT VISIT,PROCEDURE ONLY: CPT | Performed by: PODIATRIST

## 2020-04-20 PROCEDURE — 11721 DEBRIDE NAIL 6 OR MORE: CPT | Performed by: PODIATRIST

## 2020-04-20 ASSESSMENT — ENCOUNTER SYMPTOMS
SHORTNESS OF BREATH: 0
COLOR CHANGE: 0
NAUSEA: 0
DIARRHEA: 0
BACK PAIN: 0

## 2020-04-20 NOTE — PROGRESS NOTES
SUBJECTIVE: Raz Wells is a 78 y.o. male who returns to the office with chief complaint of painful fungal toenails. Patient relates toe nails are thickened/difficult to trim as well as painful with ambulation and with shoe gear. Patient also complains today of increase in pain to both feet. Chief Complaint   Patient presents with    Nail Problem     nail trim/last saw Kelsie Turner 12/11/2019    Diabetes     last a1c 7.3     Review of Systems   Constitutional: Negative for activity change, appetite change, chills, diaphoresis, fatigue and fever. Respiratory: Negative for shortness of breath. Cardiovascular: Negative for leg swelling. Gastrointestinal: Negative for diarrhea and nausea. Endocrine: Negative for cold intolerance, heat intolerance and polyuria. Musculoskeletal: Positive for arthralgias and gait problem. Negative for back pain, joint swelling and myalgias. Skin: Negative for color change, pallor, rash and wound. Allergic/Immunologic: Negative for environmental allergies and food allergies. Neurological: Negative for dizziness, weakness, light-headedness and numbness. Hematological: Does not bruise/bleed easily. Psychiatric/Behavioral: Negative for behavioral problems, confusion and self-injury. The patient is not nervous/anxious. OBJECTIVE: Clinical evaluation of patient reveals nails 1,2,3,4,5 of the right foot and nails 1,2,3,4,5, of the left foot to present with thickness, elongation, discoloration, brittleness, and subungual debris. There was pain with palpation and debridement of the toenails of the bilateral feet. No open lesions noted to either foot today. No specific area of pain was found to either foot, but a generalized tingling and pain to the bottom of both feet. The right DP pulse is palpable. The left DP pulse is palpable. The right PT pulse is not palpable. The left PT pulse is not palpable.    Protective sensation is absent to the right plantar

## 2020-04-24 ENCOUNTER — TELEPHONE (OUTPATIENT)
Dept: ADMINISTRATIVE | Age: 80
End: 2020-04-24

## 2020-05-18 RX ORDER — LEVOTHYROXINE SODIUM 0.12 MG/1
TABLET ORAL
Qty: 90 TABLET | Refills: 3 | Status: SHIPPED | OUTPATIENT
Start: 2020-05-18 | End: 2021-02-23

## 2020-05-29 ENCOUNTER — TELEPHONE (OUTPATIENT)
Dept: INTERNAL MEDICINE CLINIC | Age: 80
End: 2020-05-29

## 2020-05-29 RX ORDER — PANTOPRAZOLE SODIUM 20 MG/1
20 TABLET, DELAYED RELEASE ORAL DAILY
Qty: 30 TABLET | Refills: 3 | Status: SHIPPED | OUTPATIENT
Start: 2020-05-29 | End: 2020-06-12 | Stop reason: SDUPTHER

## 2020-06-12 RX ORDER — PANTOPRAZOLE SODIUM 20 MG/1
20 TABLET, DELAYED RELEASE ORAL DAILY
Qty: 90 TABLET | Refills: 3 | Status: SHIPPED | OUTPATIENT
Start: 2020-06-12 | End: 2021-02-23

## 2020-06-18 ENCOUNTER — OFFICE VISIT (OUTPATIENT)
Dept: ORTHOPEDIC SURGERY | Age: 80
End: 2020-06-18
Payer: MEDICARE

## 2020-06-18 PROBLEM — M48.062 SPINAL STENOSIS OF LUMBAR REGION WITH NEUROGENIC CLAUDICATION: Status: ACTIVE | Noted: 2020-06-18

## 2020-06-18 PROCEDURE — 99213 OFFICE O/P EST LOW 20 MIN: CPT | Performed by: ORTHOPAEDIC SURGERY

## 2020-06-18 NOTE — PROGRESS NOTES
1000 units TABS, Take 1,000 Units by mouth daily , Disp: , Rfl:     vitamin E 1000 units capsule, Take 1,000 Units by mouth daily, Disp: , Rfl:     docusate sodium (COLACE) 100 MG capsule, Take 100 mg by mouth daily , Disp: , Rfl:     vitamin B-12 (CYANOCOBALAMIN) 1000 MCG tablet, Take 1,000 mcg by mouth daily, Disp: , Rfl:   Allergies   Allergen Reactions    Lisinopril Other (See Comments)     COUGH      Social History     Socioeconomic History    Marital status: Single     Spouse name: Not on file    Number of children: 0    Years of education: Not on file    Highest education level: Not on file   Occupational History    Not on file   Social Needs    Financial resource strain: Not on file    Food insecurity     Worry: Not on file     Inability: Not on file   [employer] needs     Medical: Not on file     Non-medical: Not on file   Tobacco Use    Smoking status: Former Smoker     Packs/day: 1.50     Years: 40.00     Pack years: 60.00     Types: Cigarettes     Last attempt to quit: 1988     Years since quittin.7    Smokeless tobacco: Never Used   Substance and Sexual Activity    Alcohol use: No    Drug use: No    Sexual activity: Not on file   Lifestyle    Physical activity     Days per week: Not on file     Minutes per session: Not on file    Stress: Not on file   Relationships    Social connections     Talks on phone: Not on file     Gets together: Not on file     Attends Anabaptist service: Not on file     Active member of club or organization: Not on file     Attends meetings of clubs or organizations: Not on file     Relationship status: Not on file    Intimate partner violence     Fear of current or ex partner: Not on file     Emotionally abused: Not on file     Physically abused: Not on file     Forced sexual activity: Not on file   Other Topics Concern    Not on file   Social History Narrative    Not on file     Past Medical History:   Diagnosis Date    Arthritis     Diabetes mellitus ([facility]     HISTORY OF, NO MEDS CURRENTLY    Hypertension     Hypothyroid     SOB (shortness of breath) on exertion      Past Surgical History:   Procedure Laterality Date    BACK SURGERY      Patient states he isnt sure if he had back surgery    CATARACT REMOVAL WITH IMPLANT      COLONOSCOPY N/A 2/10/2020    COLONOSCOPY WITH BIOPSY performed by Ludivina Lee [facility]      Right hip replacement    LUMBAR EPIDURAL STEROID INJECTION/CAUDAL  3/25/2019         UPPER GASTROINTESTINAL ENDOSCOPY N/A 2/10/2020    EGD BIOPSY performed by Ludivina Lee MD at [facility]     Family History   Problem Relation Age of Onset    No Known Problems Mother     No Known Problems Father     No Known Problems Brother         Physical Exam:  Constitutional: Patient is oriented to person, place, and time. Patient appears well-developed and well nourished. HENT: Negative otherwise noted  Head: Normocephalic and Atraumatic  Nose: Normal  Eyes: Conjunctivae and EOM are normal  Neck: Normal range of motion Neck supple. Respiratory/Cardio: Effort normal. No respiratory distress. Musculoskeletal: Antlagic gait. Neurological: Patient is alert and oriented to person, place, and time. Normal strenght. No sensory deficit. Skin: Skin is warm and dry  Psychiatric: Behavior is normal. Thought content normal.  Nursing note and vitals reviewed.      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 Radicular pain of right lower extremity    2. Scoliosis (and kyphoscoliosis), idiopathic    3. Acquired spondylolisthesis    4. Spinal stenosis of lumbar region with neurogenic claudication    5. Lumbar radicular pain    6. Arthritis of right hip      At this time I believe the patient is having pain generated from his hip and his low back. I believe the patient's major pain generator as it appears to be more radicular nature is his back. Patient has known very severe lumbar spinal stenosis L2-3 L3-4    Okay to schedule L2-4 decompression    Scribe Attestation:   By signing my name below, I, Milly Dior, attest that this documentation has been prepared under the direction and in the presence of Hailey Travis MD.     Electronically Signed: Milly Dior. 6/18/20. 2:45 PM EDT. Provider Attestation:  Shireen Basurto, personally performed the services described in this documentation. All medical record entries made by the scribe were at my direction and in my presence. I have reviewed the chart and discharge instructions and agree that the records reflect my personal performance and is accurate and complete. Tahira Majano MD 06/18/20        Please note that this chart was generated using voice recognition Dragon dictation software. Although every effort was made to ensure the accuracy of this automated transcription, some errors in transcription may have occurred.

## 2021-01-11 ENCOUNTER — TELEPHONE (OUTPATIENT)
Dept: INTERNAL MEDICINE CLINIC | Age: 81
End: 2021-01-11

## 2021-01-12 ENCOUNTER — TELEPHONE (OUTPATIENT)
Dept: INTERNAL MEDICINE CLINIC | Age: 81
End: 2021-01-12

## 2021-01-12 ENCOUNTER — OFFICE VISIT (OUTPATIENT)
Dept: INTERNAL MEDICINE CLINIC | Age: 81
End: 2021-01-12
Payer: MEDICARE

## 2021-01-12 VITALS
TEMPERATURE: 97 F | HEART RATE: 96 BPM | OXYGEN SATURATION: 97 % | WEIGHT: 198 LBS | BODY MASS INDEX: 30.11 KG/M2 | SYSTOLIC BLOOD PRESSURE: 130 MMHG | DIASTOLIC BLOOD PRESSURE: 76 MMHG

## 2021-01-12 DIAGNOSIS — B86 SCABIES INFESTATION: ICD-10-CM

## 2021-01-12 DIAGNOSIS — E11.9 TYPE 2 DIABETES MELLITUS WITHOUT COMPLICATION, WITHOUT LONG-TERM CURRENT USE OF INSULIN (HCC): Primary | ICD-10-CM

## 2021-01-12 PROCEDURE — 99213 OFFICE O/P EST LOW 20 MIN: CPT | Performed by: INTERNAL MEDICINE

## 2021-01-12 RX ORDER — IVERMECTIN 3 MG/1
12 TABLET ORAL ONCE
Qty: 4 TABLET | Refills: 0 | Status: SHIPPED | OUTPATIENT
Start: 2021-01-12 | End: 2021-01-12

## 2021-01-12 RX ORDER — HYDROXYZINE PAMOATE 25 MG/1
25 CAPSULE ORAL 2 TIMES DAILY PRN
Qty: 60 CAPSULE | Refills: 0 | Status: SHIPPED | OUTPATIENT
Start: 2021-01-12 | End: 2021-02-10 | Stop reason: SDUPTHER

## 2021-01-12 RX ORDER — DIAPER,BRIEF,INFANT-TODD,DISP
EACH MISCELLANEOUS
Qty: 2 TUBE | Refills: 0 | Status: SHIPPED | OUTPATIENT
Start: 2021-01-12 | End: 2021-02-23

## 2021-01-12 ASSESSMENT — PATIENT HEALTH QUESTIONNAIRE - PHQ9
8. MOVING OR SPEAKING SO SLOWLY THAT OTHER PEOPLE COULD HAVE NOTICED. OR THE OPPOSITE, BEING SO FIGETY OR RESTLESS THAT YOU HAVE BEEN MOVING AROUND A LOT MORE THAN USUAL: 0
1. LITTLE INTEREST OR PLEASURE IN DOING THINGS: 3
5. POOR APPETITE OR OVEREATING: 1
SUM OF ALL RESPONSES TO PHQ QUESTIONS 1-9: 13
6. FEELING BAD ABOUT YOURSELF - OR THAT YOU ARE A FAILURE OR HAVE LET YOURSELF OR YOUR FAMILY DOWN: 3
SUM OF ALL RESPONSES TO PHQ QUESTIONS 1-9: 13
SUM OF ALL RESPONSES TO PHQ9 QUESTIONS 1 & 2: 6

## 2021-01-12 NOTE — PROGRESS NOTES
Visit Information    Have you changed or started any medications since your last visit including any over-the-counter medicines, vitamins, or herbal medicines? no   Are you having any side effects from any of your medications? -  no  Have you stopped taking any of your medications? Is so, why? -  no    Have you seen any other physician or provider since your last visit? No  Have you had any other diagnostic tests since your last visit? No  Have you been seen in the emergency room and/or had an admission to a hospital since we last saw you? No  Have you had your routine dental cleaning in the past 6 months? no    Have you activated your [employer] account? If not, what are your barriers?  No:      Patient Care Team:  Xena Turner MD as PCP - General (Internal Medicine)  Xena Turner MD as PCP - [facility]    Medical History Review  Past Medical, Family, and Social History reviewed and does not contribute to the patient presenting condition    Health Maintenance   Topic Date Due    DTaP/Tdap/Td vaccine (1 - Tdap) 09/20/1957    Shingles Vaccine (1 of 2) 09/20/1988    Pneumococcal 65+ years Vaccine (2 of 2 - PPSV23) 10/05/2018    Annual Wellness Visit (AWV)  05/29/2019    TSH testing  01/10/2020    Potassium monitoring  02/10/2021    Creatinine monitoring  02/10/2021    Flu vaccine  Completed    Hepatitis A vaccine  Aged Out    Hib vaccine  Aged Out    Meningococcal (ACWY) vaccine  Aged Out                                                                                                                                                                                                       OFFICE VISIT  PROGRESS NOTE         Date of patient's visit: 1/14/21  Patient's Name:  Pedro Adams  YOB: 1940       Patient Care Team:  Xena Turner MD as PCP - General (Internal Medicine)  Xena Turner MD as PCP - [facility]        SUBJECTIVE     HISTORY History of present illness     Pertinent details  added to ,       Chief Complaint   Patient presents with    Rash     hands, arms and back covered in a red rash, itches started 3 months ago          Encounter Diagnoses   Name Primary?  Type 2 diabetes mellitus without complication, without long-term current use of insulin ([facility]) Yes    Scabies infestation         Symptom / problem          --history obtained from patient. -   Patient is known to be diabetic   Patient has been having this rash on his hands upper extremities upper chest upper back also some rash in the lower extremities   Patient has classical rash of advanced scabies with Davey other issues also  Significant scratching and erythema     MEDICATIONS:      Current Outpatient Medications   Medication Sig Dispense Refill    hydrocortisone 1 % cream Apply topically 2 times daily.  2 Tube 0    hydrOXYzine (VISTARIL) 25 MG capsule Take 1 capsule by mouth 2 times daily as needed for Itching 60 capsule 0    pantoprazole (PROTONIX) 20 MG tablet Take 1 tablet by mouth daily 90 tablet 3    levothyroxine (SYNTHROID) 125 MCG tablet TAKE 1 TABLET BY MOUTH EVERY DAY 90 tablet 3    famotidine (PEPCID) 20 MG tablet TAKE 1 TABLET BY MOUTH TWICE A  tablet 1    losartan (COZAAR) 25 MG tablet TAKE 1 TABLET BY MOUTH EVERY DAY 90 tablet 1    furosemide (LASIX) 20 MG tablet Take 1 tablet by mouth daily 60 tablet 3    Blood Pressure Monitoring (B-D ASSURE BPM/AUTO ARM CUFF) MISC 1 Device by Does not apply route daily 1 each 0    Omega-3 Fatty Acids (FISH OIL) 1000 MG CAPS Take 1,000 mg by mouth daily       Cholecalciferol (VITAMIN D3) 1000 units TABS Take 1,000 Units by mouth daily       vitamin E 1000 units capsule Take 1,000 Units by mouth daily      docusate sodium (COLACE) 100 MG capsule Take 100 mg by mouth daily       vitamin B-12 (CYANOCOBALAMIN) 1000 MCG tablet Take 1,000 mcg by mouth daily      ACCU-CHEK AZEB PLUS strip USE TO TEST 2 TO 3 TIMES DAILY (Patient not taking: Reported on 1/12/2021) 100 strip 3     No current facility-administered medications for this visit. ALLERGIES:      Allergies   Allergen Reactions    Lisinopril Other (See Comments)     COUGH        SOCIAL HISTORY   Reviewed and no change from previous record. Phoebe Burnett  reports that he quit smoking about 32 years ago. His smoking use included cigarettes. He has a 60.00 pack-year smoking history. He has never used smokeless tobacco.    FAMILY HISTORY:    Reviewed and No change from previous visit    REVIEW OF SYSTEMS:    Review of Systems        OBJECTIVE      Physical exam           Vitals:    01/12/21 1509   BP: 130/76   Site: Right Upper Arm   Pulse: 96   Temp: 97 °F (36.1 °C)   SpO2: 97%   Weight: 198 lb (89.8 kg)         Physical Exam   Vitals:  /76 (Site: Right Upper Arm)   Pulse 96   Temp 97 °F (36.1 °C)   Wt 198 lb (89.8 kg)   SpO2 97%   BMI 30.11 kg/m²                 Body mass index is 30.11 kg/m².      Vitals:    01/12/21 1509   BP: 130/76   Site: Right Upper Arm   Pulse: 96   Temp: 97 °F (36.1 °C)   SpO2: 97%   Weight: 198 lb (89.8 kg)       General -alert, well appearing, and in no distress  Skin  Eyes - pupils equal and reactive, extraocular eye movementsintact  Ears - bilateral TM's and external ear canals normal  Nose - normal and patent, no erythema, discharge or polyps  Mouth - mucous membranes are moist, pharynx normal without lesions  Neck - supple, no significant adenopathy  Lymphatics - no palpable lymphadenopathy, no hepatosplenomegaly    Chest - clear to auscultation, no wheezes, rales or rhonchi, symmetric air entry    Heart - normal rate, regular rhythm, no murmurs, rubs, clicks or gallops    Extremities - peripheral pulses normal, no pedal edema       Abdomen - soft, nontender, nondistended, no masses or organomegaly    Back - full range of motion, notenderness, palpable spasm or pain on motion    Neurological - alert, oriented, normal NOT REPORTED     RBC Morphology NOT REPORTED     Platelet Estimate NOT REPORTED    Comprehensive Metabolic Panel w/ Reflex to MG   Result Value Ref Range    Glucose 187 (H) 70 - 99 mg/dL    BUN 22 8 - 23 mg/dL    CREATININE 0.78 0.70 - 1.20 mg/dL    Bun/Cre Ratio NOT REPORTED 9 - 20    Calcium 9.9 8.6 - 10.4 mg/dL    Sodium 138 135 - 144 mmol/L    Potassium 4.2 3.7 - 5.3 mmol/L    Chloride 100 98 - 107 mmol/L    CO2 25 20 - 31 mmol/L    Anion Gap 13 9 - 17 mmol/L    Alkaline Phosphatase 197 (H) 40 - 129 U/L    ALT 19 5 - 41 U/L    AST 20 <40 U/L    Total Bilirubin 0.31 0.3 - 1.2 mg/dL    Total Protein 6.5 6.4 - 8.3 g/dL    Alb 3.8 3.5 - 5.2 g/dL    Albumin/Globulin Ratio NOT REPORTED 1.0 - 2.5    GFR Non-African American >60 >60 mL/min    GFR African American >60 >60 mL/min    GFR Comment          GFR Staging NOT REPORTED    Lipase   Result Value Ref Range    Lipase 38 13 - 60 U/L   Brain Natriuretic Peptide   Result Value Ref Range    Pro- (H) <300 pg/mL    BNP Interpretation Pro-BNP Reference Range:    Lactic Acid   Result Value Ref Range    Lactic Acid 2.2 0.5 - 2.2 mmol/L   Troponin   Result Value Ref Range    Troponin, High Sensitivity 23 (H) 0 - 22 ng/L    Troponin T NOT REPORTED <0.03 ng/mL    Troponin Interp NOT REPORTED    Troponin   Result Value Ref Range    Troponin, High Sensitivity 20 0 - 22 ng/L    Troponin T NOT REPORTED <0.03 ng/mL    Troponin Interp NOT REPORTED    Protime-INR   Result Value Ref Range    Protime 13.6 11.8 - 14.6 sec    INR 1.1    APTT   Result Value Ref Range    PTT 31.1 24.0 - 36.0 sec   Hgb/Hct   Result Value Ref Range    Hemoglobin 11.7 (L) 13.5 - 17.5 g/dL    Hematocrit 33.0 (L) 41 - 53 %   Hgb/Hct   Result Value Ref Range    Hemoglobin 11.6 (L) 13.5 - 17.5 g/dL    Hematocrit 33.9 (L) 41 - 53 %   Hgb/Hct   Result Value Ref Range    Hemoglobin 11.3 (L) 13.5 - 17.5 g/dL    Hematocrit 31.4 (L) 41 - 53 %   Hgb/Hct   Result Value Ref Range    Hemoglobin 11.6 (L) 13.5 - 17.5 g/dL    Hematocrit 33.3 (L) 41 - 53 %   IRON AND TIBC   Result Value Ref Range    Iron 74 59 - 158 ug/dL    TIBC 199 (L) 250 - 450 ug/dL    Iron Saturation 37 20 - 55 %    UIBC 125 112 - 347 ug/dL   FERRITIN   Result Value Ref Range    Ferritin 262 30 - 400 ug/L   Hgb/Hct   Result Value Ref Range    Hemoglobin 11.6 (L) 13.5 - 17.5 g/dL    Hematocrit 33.8 (L) 41 - 53 %   Basic Metabolic Panel w/ Reflex to MG   Result Value Ref Range    Glucose 123 (H) 70 - 99 mg/dL    BUN 12 8 - 23 mg/dL    CREATININE 0.50 (L) 0.70 - 1.20 mg/dL    Bun/Cre Ratio NOT REPORTED 9 - 20    Calcium 9.1 8.6 - 10.4 mg/dL    Sodium 137 135 - 144 mmol/L    Potassium 3.8 3.7 - 5.3 mmol/L    Chloride 104 98 - 107 mmol/L    CO2 22 20 - 31 mmol/L    Anion Gap 11 9 - 17 mmol/L    GFR Non-African American >60 >60 mL/min    GFR African American >60 >60 mL/min    GFR Comment          GFR Staging NOT REPORTED    Hgb/Hct   Result Value Ref Range    Hemoglobin 11.6 (L) 13.5 - 17.5 g/dL    Hematocrit 33.7 (L) 41 - 53 %   Surgical Pathology   Result Value Ref Range    Surgical Pathology Report       [id number]  [address], [address] [place] [address]  [phone]  Fax: [phone]  SURGICAL PATHOLOGY REPORT    Patient Name: Sherryn Claude  MR#: [id number]  Specimen #[id number]       Final Diagnosis  STOMACH, BIOPSY:         MINIMAL CHRONIC INACTIVE GASTRITIS     BY H&E STAINING, HELICOBACTER PYLORI MICROORGANISMS ARE NOT  IDENTIFIED       Glennie Lennox. Kristen Vasquez D.O.  **Electronically Signed Out**         [facility]/2/11/2020    Clinical Information  Rectal bleed; EGD; colonoscopy diagnostic, severe diverticulosis;  formalin time: 11:21    Source:  A: Stomach biopsy    Gross Description  Received in formalin labeled \"stomach biopsy\" are three portions of  pink soft tissue each measuring approximately 0.2 cm in greatest  dimension. The specimen is entirely submitted in a single cassette.    LJF/cj     Microscopic Description  Microscopic examination of two H&E slides confirms the diagnosis. EKG 12 Lead   Result Value Ref Range    Ventricular Rate 93 BPM    Atrial Rate 441 BPM    QRS Duration 98 ms    Q-T Interval 348 ms    QTc Calculation (Bazett) 432 ms    R Axis -42 degrees    T Axis 114 degrees                     VITALS INCLUDING BMI REVIEWED WITH PATIENT  Labs reviewed as noted above   Discussed with patient        ASSESSMENT / Job Markus was seen today for rash. Diagnoses and all orders for this visit:    Type 2 diabetes mellitus without complication, without long-term current use of insulin ([facility])    Scabies infestation    Other orders  -     ivermectin 3 MG tablet; Take 4 tablets by mouth once for 1 dose  -     hydrocortisone 1 % cream; Apply topically 2 times daily. -     hydrOXYzine (VISTARIL) 25 MG capsule; Take 1 capsule by mouth 2 times daily as needed for Itching               SALIENT  ACTIONS TODAYS VISIT       Today's office visit [address]    --Patient with advanced scabies started on ivermectin and local steroids and given direction regarding laundry and all also started on Vistaril for pruritus--            Discussed use, benefit, and side effects of prescribed medications. [x] yes    All patient questions answered. Patient voiced understanding. Patient given educational materials - see patient instructions  [] yes         There are no discontinued medications. No orders of the defined types were placed in this encounter. There are no Patient Instructions on file for this visit. Medication List          Accurate as of January 12, 2021 11:59 PM. If you have any questions, ask your nurse or doctor. START taking these medications    hydrocortisone 1 % cream  Apply topically 2 times daily.   Started by: Edie Thomason MD     hydrOXYzine 25 MG capsule  Commonly known as: Vistaril  Take 1 capsule by mouth 2 times daily as needed for Itching  Started by: Boaz Hare MD     ivermectin 3 MG tablet  Take 4 tablets by mouth once for 1 dose  Started by: Boaz Hare MD        CONTINUE taking these medications    Accu-Chek Tona Plus strip  Generic drug: blood glucose test strips  USE TO TEST 2 TO 3 TIMES DAILY     B-D ASSURE BPM/AUTO ARM CUFF Misc  1 Device by Does not apply route daily     docusate sodium 100 MG capsule  Commonly known as: COLACE     famotidine 20 MG tablet  Commonly known as: PEPCID  TAKE 1 TABLET BY MOUTH TWICE A DAY     fish oil 1000 MG Caps     furosemide 20 MG tablet  Commonly known as: Lasix  Take 1 tablet by mouth daily     levothyroxine 125 MCG tablet  Commonly known as: SYNTHROID  TAKE 1 TABLET BY MOUTH EVERY DAY     losartan 25 MG tablet  Commonly known as: COZAAR  TAKE 1 TABLET BY MOUTH EVERY DAY     pantoprazole 20 MG tablet  Commonly known as: Protonix  Take 1 tablet by mouth daily     vitamin B-12 1000 MCG tablet  Commonly known as: CYANOCOBALAMIN     vitamin D3 25 MCG (1000 UT) Tabs tablet  Commonly known as: CHOLECALCIFEROL     vitamin E 1000 units capsule           Where to Get Your Medications      These medications were sent to [facility]/pharmacy [facility], [address] [id number]  [facility], [address]    Phone: [phone]   hydrOXYzine 25 MG capsule  ivermectin 3 MG tablet     You can get these medications from any pharmacy    Bring a paper prescription for each of these medications  hydrocortisone 1 % cream             FOLLOW UP  PLANS     No follow-ups on file. MD RYNE Ontiveros [address], [address] [place] [address].    Phone [phone]   Fax: [phone]  Answering Service: [phone]

## 2021-01-12 NOTE — TELEPHONE ENCOUNTER
Pt has not heard back from [facility] about medications that Dr. Morrow Rumriaz was going to start him on. He just wanted a reminder put in that he was waiting on these to be called in and would like a call once they have been sent to the pharmacy. These are medications they discussed today 1/12/2021 at his visit.

## 2021-01-19 ENCOUNTER — TELEPHONE (OUTPATIENT)
Dept: INTERNAL MEDICINE CLINIC | Age: 81
End: 2021-01-19

## 2021-01-19 ENCOUNTER — OFFICE VISIT (OUTPATIENT)
Dept: PODIATRY | Age: 81
End: 2021-01-19
Payer: MEDICARE

## 2021-01-19 VITALS — BODY MASS INDEX: 30.11 KG/M2 | WEIGHT: 198 LBS

## 2021-01-19 DIAGNOSIS — M79.675 PAIN OF TOES OF BOTH FEET: ICD-10-CM

## 2021-01-19 DIAGNOSIS — E11.51 TYPE 2 DIABETES MELLITUS WITH PERIPHERAL VASCULAR DISEASE (HCC): ICD-10-CM

## 2021-01-19 DIAGNOSIS — E11.42 DIABETIC POLYNEUROPATHY ASSOCIATED WITH TYPE 2 DIABETES MELLITUS (HCC): ICD-10-CM

## 2021-01-19 DIAGNOSIS — B35.1 ONYCHOMYCOSIS OF TOENAIL: Primary | ICD-10-CM

## 2021-01-19 DIAGNOSIS — M79.674 PAIN OF TOES OF BOTH FEET: ICD-10-CM

## 2021-01-19 PROCEDURE — 11721 DEBRIDE NAIL 6 OR MORE: CPT | Performed by: PODIATRIST

## 2021-01-21 ASSESSMENT — ENCOUNTER SYMPTOMS
COLOR CHANGE: 0
DIARRHEA: 0
NAUSEA: 0
SHORTNESS OF BREATH: 0
BACK PAIN: 0

## 2021-01-21 NOTE — PROGRESS NOTES
SUBJECTIVE: Berta Chiu is a 78 y.o. male who returns to the office with chief complaint of painful fungal toenails. Patient relates toe nails are thickened/difficult to trim as well as painful with ambulation and with shoe gear. Chief Complaint   Patient presents with    Nail Problem     nail trim/last saw Sarah Vigil 1/12/21    Diabetes     last a1c 7.3     Review of Systems   Constitutional: Negative for activity change, appetite change, chills, diaphoresis, fatigue and fever. Respiratory: Negative for shortness of breath. Cardiovascular: Negative for leg swelling. Gastrointestinal: Negative for diarrhea and nausea. Endocrine: Negative for cold intolerance, heat intolerance and polyuria. Musculoskeletal: Positive for arthralgias and gait problem. Negative for back pain, joint swelling and myalgias. Skin: Negative for color change, pallor, rash and wound. Allergic/Immunologic: Negative for environmental allergies and food allergies. Neurological: Negative for dizziness, weakness, light-headedness and numbness. Hematological: Does not bruise/bleed easily. Psychiatric/Behavioral: Negative for behavioral problems, confusion and self-injury. The patient is not nervous/anxious. OBJECTIVE: Clinical evaluation of patient reveals nails 1,2,3,4,5 of the right foot and nails 1,2,3,4,5, of the left foot to present with thickness, elongation, discoloration, brittleness, and subungual debris. There was pain with palpation and debridement of the toenails of the bilateral feet. No open lesions noted to either foot today. The right DP pulse is palpable. The left DP pulse is palpable. The right PT pulse is not palpable. The left PT pulse is not palpable. Protective sensation is absent to the right plantar foot as noted with a 5.07 Odin-Shubham monofilament. Protective sensation is absent to the left plantar foot as noted with a 5.07 Odin-Shubham monofilament.    HbA1c: 7.3 %.    Class A Findings (1 needed)   [] Non-traumatic amputation of foot or integral skeleton portion thereof. [] Q7.      Class B Findings (2 needed)   1. [x] Absent posterior tibial pulse   2. [] Absent dorsalis pedis pulse   3. [] Advanced trophic changes; three of the following are required:   ·         [] hair growth (decrease or absence)   ·         [] nail changes (thickening)   ·         [] pigmentary changes (discoloration)   ·         [] skin texture (thin, shiny)   ·         [] skin color (rubor or redness)   [] Q8.      Class C Findings (1 Class B, 2 Class C needed)   1. [] Claudication   2. [] Temperature changes   3. [] Edema   4. [x] Paresthesia   5. [x] Burning   [x] Q9.     ASSESSMENT:    Diagnosis Orders   1. Onychomycosis of toenail  DE DEBRIDEMENT OF NAILS, 6 OR MORE    HM DIABETES FOOT EXAM   2. Pain of toes of both feet  DE DEBRIDEMENT OF NAILS, 6 OR MORE    HM DIABETES FOOT EXAM   3. Type 2 diabetes mellitus with peripheral vascular disease (HCC)  DE DEBRIDEMENT OF NAILS, 6 OR MORE    HM DIABETES FOOT EXAM   4. Diabetic polyneuropathy associated with type 2 diabetes mellitus (HCC)  DE DEBRIDEMENT OF NAILS, 6 OR MORE    HM DIABETES FOOT EXAM     PLAN: Toenails 1,2,3,4,5 of the right foot and 1,2,3,4,5 of the left foot were debrided in length and thickness using a nail nipper and a . Return in about 9 weeks (around 3/23/2021) for At risk diabetic foot care.    1/19/2021      Mathieu Gutierrez DPM

## 2021-01-25 ENCOUNTER — TELEPHONE (OUTPATIENT)
Dept: INTERNAL MEDICINE CLINIC | Age: 81
End: 2021-01-25

## 2021-01-25 NOTE — TELEPHONE ENCOUNTER
Patients niece called stating the medication helped his scabies, however patient has now developed fluid filled bumps on his hand and underarm. States they do not hurt or itch.           Jody Guaman, patients niece

## 2021-01-26 ENCOUNTER — TELEPHONE (OUTPATIENT)
Dept: INTERNAL MEDICINE CLINIC | Age: 81
End: 2021-01-26

## 2021-01-26 NOTE — TELEPHONE ENCOUNTER
PA for hydroxyzine was denied PA, patient has follow up with Dr Quentin Clark 1/27/21 to discuss meds.

## 2021-01-27 ENCOUNTER — OFFICE VISIT (OUTPATIENT)
Dept: INTERNAL MEDICINE CLINIC | Age: 81
End: 2021-01-27
Payer: MEDICARE

## 2021-01-27 VITALS
SYSTOLIC BLOOD PRESSURE: 120 MMHG | DIASTOLIC BLOOD PRESSURE: 82 MMHG | HEIGHT: 68 IN | RESPIRATION RATE: 16 BRPM | HEART RATE: 76 BPM | WEIGHT: 195 LBS | TEMPERATURE: 97.8 F | BODY MASS INDEX: 29.55 KG/M2

## 2021-01-27 DIAGNOSIS — E03.9 HYPOTHYROIDISM, UNSPECIFIED TYPE: ICD-10-CM

## 2021-01-27 DIAGNOSIS — E11.9 TYPE 2 DIABETES MELLITUS WITHOUT COMPLICATION, WITHOUT LONG-TERM CURRENT USE OF INSULIN (HCC): ICD-10-CM

## 2021-01-27 DIAGNOSIS — Z00.00 HEALTHCARE MAINTENANCE: ICD-10-CM

## 2021-01-27 DIAGNOSIS — R21 RASH: Primary | ICD-10-CM

## 2021-01-27 PROCEDURE — 99213 OFFICE O/P EST LOW 20 MIN: CPT | Performed by: INTERNAL MEDICINE

## 2021-01-27 RX ORDER — PREDNISONE 20 MG/1
20 TABLET ORAL DAILY
Qty: 7 TABLET | Refills: 0 | Status: SHIPPED | OUTPATIENT
Start: 2021-01-27 | End: 2021-02-03

## 2021-01-27 RX ORDER — CEPHALEXIN 250 MG/1
250 CAPSULE ORAL 4 TIMES DAILY
Qty: 28 CAPSULE | Refills: 0 | Status: SHIPPED | OUTPATIENT
Start: 2021-01-27 | End: 2021-02-03

## 2021-01-27 ASSESSMENT — PATIENT HEALTH QUESTIONNAIRE - PHQ9
1. LITTLE INTEREST OR PLEASURE IN DOING THINGS: 0
SUM OF ALL RESPONSES TO PHQ9 QUESTIONS 1 & 2: 0

## 2021-01-27 NOTE — PROGRESS NOTES
Visit Information    Have you changed or started any medications since your last visit including any over-the-counter medicines, vitamins, or herbal medicines? no   Are you having any side effects from any of your medications? -  no  Have you stopped taking any of your medications? Is so, why? -  no    Have you seen any other physician or provider since your last visit? No  Have you had any other diagnostic tests since your last visit? No  Have you been seen in the emergency room and/or had an admission to a hospital since we last saw you? No  Have you had your routine dental cleaning in the past 6 months? no    Have you activated your [employer] account? If not, what are your barriers? No     Patient Care Team:  Enid Florez MD as PCP - General (Internal Medicine)  Enid Florez MD as PCP - [facility]    Medical History Review  Past Medical, Family, and Social History reviewed and does contribute to the patient presenting condition    Health Maintenance   Topic Date Due    COVID-19 Vaccine (1 of 2) 09/20/1956    DTaP/Tdap/Td vaccine (1 - Tdap) 09/20/1957    Shingles Vaccine (1 of 2) 09/20/1988    Pneumococcal 65+ years Vaccine (2 of 2 - PPSV23) 10/05/2018    Annual Wellness Visit (AWV)  05/29/2019    TSH testing  01/10/2020    Potassium monitoring  02/10/2021    Creatinine monitoring  02/10/2021    Flu vaccine  Completed    Hepatitis A vaccine  Aged Out    Hib vaccine  Aged Out    Meningococcal (ACWY) vaccine  Aged Out     Chief Complaint   Patient presents with    Other     Pt is here with c/o blisters on bilateral hands under arms,back and neck. Pt states completed a once dose scabbies medication on January 12th. Onset 5 days and tried OTC hydrocortizone,benadryl, and hydroxazine with some  relief. .     SUBJECTIVE:  Enzo Griffith is a 78 y.o. male patient who  comes for complaints of   Chief Complaint   Patient presents with    Other     Pt is here with c/o blisters on bilateral on file     Gets together: Not on file     Attends Scientologist service: Not on file     Active member of club or organization: Not on file     Attends meetings of clubs or organizations: Not on file     Relationship status: Not on file    Intimate partner violence     Fear of current or ex partner: Not on file     Emotionally abused: Not on file     Physically abused: Not on file     Forced sexual activity: Not on file   Other Topics Concern    Not on file   Social History Narrative    Not on file           CURRENT MEDICATIONS:  Current Outpatient Medications   Medication Sig Dispense Refill    hydrocortisone 1 % cream Apply topically 2 times daily.  (Patient not taking: Reported on 1/27/2021) 2 Tube 0    hydrOXYzine (VISTARIL) 25 MG capsule Take 1 capsule by mouth 2 times daily as needed for Itching (Patient not taking: Reported on 1/27/2021) 60 capsule 0    pantoprazole (PROTONIX) 20 MG tablet Take 1 tablet by mouth daily (Patient not taking: Reported on 1/27/2021) 90 tablet 3    levothyroxine (SYNTHROID) 125 MCG tablet TAKE 1 TABLET BY MOUTH EVERY DAY (Patient not taking: Reported on 1/27/2021) 90 tablet 3    famotidine (PEPCID) 20 MG tablet TAKE 1 TABLET BY MOUTH TWICE A DAY (Patient not taking: Reported on 1/27/2021) 180 tablet 1    losartan (COZAAR) 25 MG tablet TAKE 1 TABLET BY MOUTH EVERY DAY (Patient not taking: Reported on 1/27/2021) 90 tablet 1    ACCU-CHEK AZEB PLUS strip USE TO TEST 2 TO 3 TIMES DAILY (Patient not taking: Reported on 1/27/2021) 100 strip 3    furosemide (LASIX) 20 MG tablet Take 1 tablet by mouth daily (Patient not taking: Reported on 1/27/2021) 60 tablet 3    Blood Pressure Monitoring (B-D ASSURE BPM/AUTO ARM CUFF) MISC 1 Device by Does not apply route daily (Patient not taking: Reported on 1/27/2021) 1 each 0    Omega-3 Fatty Acids (FISH OIL) 1000 MG CAPS Take 1,000 mg by mouth daily       Cholecalciferol (VITAMIN D3) 1000 units TABS Take 1,000 Units by mouth daily       vitamin E 1000 units capsule Take 1,000 Units by mouth daily      docusate sodium (COLACE) 100 MG capsule Take 100 mg by mouth daily       vitamin B-12 (CYANOCOBALAMIN) 1000 MCG tablet Take 1,000 mcg by mouth daily       No current facility-administered medications for this visit. OBJECTIVE:  Vitals:    01/27/21 1135   BP: 120/82   Pulse: 76   Resp: 16   Temp: 97.8 °F (36.6 °C)     Body mass index is 29.65 kg/m². Focal exam:    Picture of rash in media    General exam (except above):  General appearance - well appearing, alert, in no acute distress  Head - Atraumatic, normocephalic  Eyes - EOMI, no jaundice or pallor  Lungs - Lungs clear to auscultation. No wheezing, rhonchi, rales  Heart - RRR without murmur, gallop, or rubs. No ectopy  Abdomen - Abdomen soft, non-tender. Bowel sounds normal. No masses, organomegaly  Extremities -No significant edema, or skin discoloration. Good capillary refill. Neuro - Pt Alert, awake and oriented x 3. No gross focal neurological deficits    ASSESSMENT AND PLAN (MEDICAL DECISION MAKING):   Kelechi Beltran was seen today for other. Diagnoses and all orders for this visit:    Rash  Comments:  likely bullous pemphigoid, in addition to excoriation from scabies  Orders:  -     cephALEXin (KEFLEX) 250 MG capsule; Take 1 capsule by mouth 4 times daily for 7 days  -     predniSONE (DELTASONE) 20 MG tablet; Take 1 tablet by mouth daily for 7 days    Type 2 diabetes mellitus without complication, without long-term current use of insulin ([facility])  Comments:  Last A1C  6.4    Hypothyroidism, unspecified type    Healthcare maintenance  Comments:  Due annual wellness visit and pneumonia vaccine.            Follow up in: Tamika Abel MD

## 2021-02-02 ENCOUNTER — TELEPHONE (OUTPATIENT)
Dept: INTERNAL MEDICINE CLINIC | Age: 81
End: 2021-02-02

## 2021-02-02 NOTE — TELEPHONE ENCOUNTER
Karlos Díaz states his blisters got better for the first couple of days on steroids. Now they are back to bad just as they started. Please advise. Informed to stop antibiotic. Has appointment with Dr Tierney Lim on this Thursday.

## 2021-02-02 NOTE — TELEPHONE ENCOUNTER
Clarisse Carlson called back to inform provider that after her last ph call she went to look at the pts blisters and wanted to inform provider that blisters have improved a lot.

## 2021-02-04 ENCOUNTER — OFFICE VISIT (OUTPATIENT)
Dept: INTERNAL MEDICINE CLINIC | Age: 81
End: 2021-02-04
Payer: MEDICARE

## 2021-02-04 ENCOUNTER — HOSPITAL ENCOUNTER (OUTPATIENT)
Age: 81
Setting detail: SPECIMEN
Discharge: HOME | End: 2021-02-04
Payer: MEDICARE

## 2021-02-04 VITALS
SYSTOLIC BLOOD PRESSURE: 126 MMHG | DIASTOLIC BLOOD PRESSURE: 78 MMHG | BODY MASS INDEX: 28.55 KG/M2 | HEIGHT: 68 IN | WEIGHT: 188.4 LBS | TEMPERATURE: 97.3 F

## 2021-02-04 DIAGNOSIS — E11.9 TYPE 2 DIABETES MELLITUS WITHOUT COMPLICATION, WITHOUT LONG-TERM CURRENT USE OF INSULIN (HCC): ICD-10-CM

## 2021-02-04 DIAGNOSIS — L98.9 SKIN LESION: Primary | ICD-10-CM

## 2021-02-04 LAB — HBA1C MFR BLD: 7.9 %

## 2021-02-04 PROCEDURE — 11104 PUNCH BX SKIN SINGLE LESION: CPT | Performed by: INTERNAL MEDICINE

## 2021-02-04 PROCEDURE — 83036 HEMOGLOBIN GLYCOSYLATED A1C: CPT | Performed by: INTERNAL MEDICINE

## 2021-02-04 RX ORDER — LATANOPROST 50 UG/ML
1 SOLUTION/ DROPS OPHTHALMIC NIGHTLY
COMMUNITY
End: 2021-06-08 | Stop reason: ALTCHOICE

## 2021-02-04 NOTE — PROGRESS NOTES
Visit Information    Have you changed or started any medications since your last visit including any over-the-counter medicines, vitamins, or herbal medicines? no   Are you having any side effects from any of your medications? -  no  Have you stopped taking any of your medications? Is so, why? -  no    Have you seen any other physician or provider since your last visit? No  Have you had any other diagnostic arpan    [address] [address]  Dept: [phone]  Dept Fax: [phone]    Billy Jarrett is a 78 y.o. male who presents today for his medical conditions/complaintsas noted below.   Billy Jarrett is c/o of   Chief Complaint   Patient presents with    Diabetes    Rash     4-6 months         HPI:     HPI    Hemoglobin A1C (%)   Date Value   02/04/2021 7.9   12/11/2019 7.3   06/11/2019 7.1             ( goal A1Cis < 7)   No results found for: LABMICR  LDL Cholesterol (mg/dL)   Date Value   10/09/2017 58       (goal LDL is <100)   AST (U/L)   Date Value   02/08/2020 20     ALT (U/L)   Date Value   02/08/2020 19     BUN (mg/dL)   Date Value   02/10/2020 12     BP Readings from Last 3 Encounters:   02/04/21 126/78   01/27/21 120/82   01/12/21 130/76          (goal 120/80)    Past Medical History:   Diagnosis Date    Arthritis     Diabetes mellitus ([address]     HISTORY OF, NO MEDS CURRENTLY    Hypertension     Hypothyroid     SOB (shortness of breath) on exertion       Past Surgical History:   Procedure Laterality Date    BACK SURGERY      Patient states he isnt sure if he had back surgery    CATARACT REMOVAL WITH IMPLANT      COLONOSCOPY N/A 2/10/2020    COLONOSCOPY WITH BIOPSY performed by Kyay Locke MD at [address] [place] [address]      Right hip replacement    LUMBAR EPIDURAL STEROID INJECTION/CAUDAL  3/25/2019         UPPER GASTROINTESTINAL ENDOSCOPY N/A 2/10/2020 EGD BIOPSY performed by Dano Mccallum MD at [facility] ENDO       Family History   Problem Relation Age of Onset    No Known Problems Mother     No Known Problems Father     No Known Problems Brother        Social History     Tobacco Use    Smoking status: Former Smoker     Packs/day: 1.50     Years: 40.00     Pack years: 60.00     Types: Cigarettes     Quit date: 1988     Years since quittin.3    Smokeless tobacco: Never Used   Substance Use Topics    Alcohol use: No      Current Outpatient Medications   Medication Sig Dispense Refill    latanoprost (XALATAN) 0.005 % ophthalmic solution 1 drop nightly      hydrocortisone 1 % cream Apply topically 2 times daily.  (Patient not taking: Reported on 2021) 2 Tube 0    hydrOXYzine (VISTARIL) 25 MG capsule Take 1 capsule by mouth 2 times daily as needed for Itching (Patient not taking: Reported on 2021) 60 capsule 0    pantoprazole (PROTONIX) 20 MG tablet Take 1 tablet by mouth daily (Patient not taking: Reported on 2021) 90 tablet 3    levothyroxine (SYNTHROID) 125 MCG tablet TAKE 1 TABLET BY MOUTH EVERY DAY (Patient not taking: Reported on 2021) 90 tablet 3    famotidine (PEPCID) 20 MG tablet TAKE 1 TABLET BY MOUTH TWICE A DAY (Patient not taking: Reported on 2021) 180 tablet 1    losartan (COZAAR) 25 MG tablet TAKE 1 TABLET BY MOUTH EVERY DAY (Patient not taking: Reported on 2021) 90 tablet 1    ACCU-CHEK AZEB PLUS strip USE TO TEST 2 TO 3 TIMES DAILY (Patient not taking: Reported on 2021) 100 strip 3    furosemide (LASIX) 20 MG tablet Take 1 tablet by mouth daily (Patient not taking: Reported on 2021) 60 tablet 3    Blood Pressure Monitoring (B-D ASSURE BPM/AUTO ARM CUFF) MISC 1 Device by Does not apply route daily (Patient not taking: Reported on 2021) 1 each 0    Omega-3 Fatty Acids (FISH OIL) 1000 MG CAPS Take 1,000 mg by mouth daily  Cholecalciferol (VITAMIN D3) 1000 units TABS Take 1,000 Units by mouth daily       vitamin E 1000 units capsule Take 1,000 Units by mouth daily      docusate sodium (COLACE) 100 MG capsule Take 100 mg by mouth daily       vitamin B-12 (CYANOCOBALAMIN) 1000 MCG tablet Take 1,000 mcg by mouth daily       No current facility-administered medications for this visit. Allergies   Allergen Reactions    Lisinopril Other (See Comments)     COUGH        Health Maintenance   Topic Date Due    COVID-19 Vaccine (1 of 2) 09/20/1956    DTaP/Tdap/Td vaccine (1 - Tdap) 09/20/1957    Shingles Vaccine (1 of 2) 09/20/1988    Pneumococcal 65+ years Vaccine (2 of 2 - PPSV23) 10/05/2018    Annual Wellness Visit (AWV)  05/29/2019    TSH testing  01/10/2020    Potassium monitoring  02/10/2021    Creatinine monitoring  02/10/2021    Flu vaccine  Completed    Hepatitis A vaccine  Aged Out    Hib vaccine  Aged Out    Meningococcal (ACWY) vaccine  Aged Out       Subjective:     Review of Systems    Objective:     Physical Exam  /78   Temp 97.3 °F (36.3 °C)   Ht 5' 8\" (1.727 m)   Wt 188 lb 6.4 oz (85.5 kg)   BMI 28.65 kg/m²     Assessment:       Diagnosis Orders   1. Skin lesion  66809 - IN PUNCH BIOPSY SKIN SINGLE LESION   2. Type 2 diabetes mellitus without complication, without long-term current use of insulin (HCC)  POCT glycosylated hemoglobin (Hb A1C)    TSH With Reflex Ft4             Plan:      No follow-ups on file. Orders Placed This Encounter   Procedures    TSH With Reflex Ft4     Standing Status:   Future     Standing Expiration Date:   2/4/2022    POCT glycosylated hemoglobin (Hb A1C)    02602 - IN PUNCH BIOPSY SKIN SINGLE LESION     No orders of the defined types were placed in this encounter.   Right dorsum of hand skin lesion aseptic technique 4 mm punch biopsy under local 1 lidocaine used well-tolerated  No immediate complication we reviewed the pathology Clinical suspicion for we will bulus phemphigoid   Patient given educational materials - see patient instructions. Discussed use, benefit, and side effects of prescribed medications. All patientquestions answered. Pt voiced understanding. Reviewed health maintenance. Instructedto continue current medications, diet and exercise. Patient agreed with treatmentplan. Follow up as directed. Electronically signed by Anup Oglesby MD on 2/4/2021 at 3:33 PMts since your last visit? No  Have you been seen in the emergency room and/or had an admission to a hospital since we last saw you? No  Have you had your routine dental cleaning in the past 6 months? no    Have you activated your [employer] account? If not, what are your barriers?  No: declined     Patient Care Team:  Anup Oglesby MD as PCP - General (Internal Medicine)  Anup Oglesby MD as PCP - [facility] Sienna Franz Provider    Medical History Review  Past Medical, Family, and Social History reviewed and does not contribute to the patient presenting condition    Health Maintenance   Topic Date Due    COVID-19 Vaccine (1 of 2) 09/20/1956    DTaP/Tdap/Td vaccine (1 - Tdap) 09/20/1957    Shingles Vaccine (1 of 2) 09/20/1988    Pneumococcal 65+ years Vaccine (2 of 2 - PPSV23) 10/05/2018    Annual Wellness Visit (AWV)  05/29/2019    TSH testing  01/10/2020    Potassium monitoring  02/10/2021    Creatinine monitoring  02/10/2021    Flu vaccine  Completed    Hepatitis A vaccine  Aged Out    Hib vaccine  Aged Out    Meningococcal (ACWY) vaccine  Aged Out

## 2021-02-05 LAB
THYROXINE, FREE: 0.76 NG/DL (ref 0.93–1.7)
TSH SERPL DL<=0.05 MIU/L-ACNC: 16.79 MIU/L (ref 0.3–5)

## 2021-02-08 LAB — DERMATOLOGY PATHOLOGY REPORT: NORMAL

## 2021-02-09 ENCOUNTER — TELEPHONE (OUTPATIENT)
Dept: INTERNAL MEDICINE CLINIC | Age: 81
End: 2021-02-09

## 2021-02-09 DIAGNOSIS — R21 RASH: Primary | ICD-10-CM

## 2021-02-09 NOTE — TELEPHONE ENCOUNTER
Pt called wanting test results for [facility] and Dermatology Pathology. Pt would like Dr Wilda Hollis to call Vaishnavi Mc [phone] with the results. Please advise.

## 2021-02-10 ENCOUNTER — TELEPHONE (OUTPATIENT)
Dept: INTERNAL MEDICINE CLINIC | Age: 81
End: 2021-02-10

## 2021-02-10 DIAGNOSIS — L12.0 BULLOUS PEMPHIGOID: Primary | ICD-10-CM

## 2021-02-10 NOTE — TELEPHONE ENCOUNTER
Cary Lyubov called for Pt Yeison Cisneros,  36. Stating that the referral for Chandler Martinez is unable to see Verdon Libman until middle of . Stanislav Hester stated that if Dr Isidoro Wallis states its a medical need, they can get him in sooner. Pt left a call back phone number     Cary Mcarthur [phone].

## 2021-02-10 NOTE — TELEPHONE ENCOUNTER
Medication: Vistaril  Last visit: 2/4/21  Next visit: 3/11/2021  Last refill: 11/12/21  Pharmacy: [facility]

## 2021-02-11 RX ORDER — HYDROXYZINE PAMOATE 25 MG/1
25 CAPSULE ORAL 2 TIMES DAILY PRN
Qty: 180 CAPSULE | Refills: 3 | Status: SHIPPED | OUTPATIENT
Start: 2021-02-11 | End: 2021-06-08 | Stop reason: ALTCHOICE

## 2021-02-23 ENCOUNTER — HOSPITAL ENCOUNTER (OUTPATIENT)
Age: 81
Setting detail: SPECIMEN
Discharge: HOME | End: 2021-02-23
Payer: MEDICARE

## 2021-02-23 ENCOUNTER — OFFICE VISIT (OUTPATIENT)
Dept: DERMATOLOGY | Age: 81
End: 2021-02-23
Payer: MEDICARE

## 2021-02-23 VITALS
OXYGEN SATURATION: 96 % | HEIGHT: 65 IN | SYSTOLIC BLOOD PRESSURE: 138 MMHG | WEIGHT: 190 LBS | BODY MASS INDEX: 31.65 KG/M2 | HEART RATE: 88 BPM | TEMPERATURE: 97.4 F | DIASTOLIC BLOOD PRESSURE: 81 MMHG

## 2021-02-23 DIAGNOSIS — R21 BLISTERING ERUPTION: ICD-10-CM

## 2021-02-23 DIAGNOSIS — L28.0 LICHEN SIMPLEX CHRONICUS: ICD-10-CM

## 2021-02-23 DIAGNOSIS — Z79.899 HIGH RISK MEDICATION USE: Primary | ICD-10-CM

## 2021-02-23 DIAGNOSIS — Z01.89 ENCOUNTER FOR OTHER SPECIFIED SPECIAL EXAMINATIONS: ICD-10-CM

## 2021-02-23 PROCEDURE — 99204 OFFICE O/P NEW MOD 45 MIN: CPT | Performed by: DERMATOLOGY

## 2021-02-23 PROCEDURE — 11104 PUNCH BX SKIN SINGLE LESION: CPT | Performed by: DERMATOLOGY

## 2021-02-23 PROCEDURE — 11105 PUNCH BX SKIN EA SEP/ADDL: CPT | Performed by: DERMATOLOGY

## 2021-02-23 RX ORDER — LIDOCAINE HYDROCHLORIDE AND EPINEPHRINE 10; 10 MG/ML; UG/ML
1 INJECTION, SOLUTION INFILTRATION; PERINEURAL ONCE
Status: SHIPPED | OUTPATIENT
Start: 2021-02-23
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
## 2021-02-26 LAB — DERMATOLOGY PATHOLOGY REPORT: NORMAL

## 2021-02-28 ENCOUNTER — TELEPHONE (OUTPATIENT)
Dept: DERMATOLOGY | Age: 81
End: 2021-02-28

## 2021-03-01 NOTE — TELEPHONE ENCOUNTER
Please inform patient that repeat biopsies supports the diagnosis of bullous pemphigoid. Continue to use clobetasol ointment and please have labs done so we can safely start treatment to clear this rash.

## 2021-03-01 NOTE — TELEPHONE ENCOUNTER
Attempted to speak to patient with regards to results, he wasn't interested in hearing them and asked that I call his niece Francine Ambriz. I spoke to Francine Ambriz and she stated they are going to get blood work done tomorrow. Please call Francine Ambrzi with regards to any further results or treatment.

## 2021-03-02 ENCOUNTER — HOSPITAL ENCOUNTER (OUTPATIENT)
Age: 81
Discharge: HOME | End: 2021-03-02
Payer: MEDICARE

## 2021-03-02 DIAGNOSIS — R21 BLISTERING ERUPTION: ICD-10-CM

## 2021-03-02 DIAGNOSIS — Z01.89 ENCOUNTER FOR OTHER SPECIFIED SPECIAL EXAMINATIONS: ICD-10-CM

## 2021-03-02 DIAGNOSIS — L28.0 LICHEN SIMPLEX CHRONICUS: ICD-10-CM

## 2021-03-02 DIAGNOSIS — Z79.899 HIGH RISK MEDICATION USE: ICD-10-CM

## 2021-03-02 LAB
ABSOLUTE EOS #: 0.1 K/UL (ref 0–0.4)
ABSOLUTE IMMATURE GRANULOCYTE: ABNORMAL K/UL (ref 0–0.3)
ABSOLUTE LYMPH #: 1.5 K/UL (ref 1–4.8)
ABSOLUTE MONO #: 0.5 K/UL (ref 0.1–1.3)
ALBUMIN SERPL-MCNC: 4.1 G/DL (ref 3.5–5.2)
ALBUMIN/GLOBULIN RATIO: ABNORMAL (ref 1–2.5)
ALP BLD-CCNC: 133 U/L (ref 40–129)
ALT SERPL-CCNC: 17 U/L (ref 5–41)
ANION GAP SERPL CALCULATED.3IONS-SCNC: 10 MMOL/L (ref 9–17)
AST SERPL-CCNC: 19 U/L
BASOPHILS # BLD: 1 % (ref 0–2)
BASOPHILS ABSOLUTE: 0.1 K/UL (ref 0–0.2)
BILIRUB SERPL-MCNC: 0.54 MG/DL (ref 0.3–1.2)
BUN BLDV-MCNC: 11 MG/DL (ref 8–23)
BUN/CREAT BLD: ABNORMAL (ref 9–20)
CALCIUM SERPL-MCNC: 9.7 MG/DL (ref 8.6–10.4)
CHLORIDE BLD-SCNC: 100 MMOL/L (ref 98–107)
CO2: 25 MMOL/L (ref 20–31)
CREAT SERPL-MCNC: 0.56 MG/DL (ref 0.7–1.2)
DIFFERENTIAL TYPE: ABNORMAL
EOSINOPHILS RELATIVE PERCENT: 2 % (ref 0–4)
GFR AFRICAN AMERICAN: >60 ML/MIN
GFR NON-AFRICAN AMERICAN: >60 ML/MIN
GFR SERPL CREATININE-BSD FRML MDRD: ABNORMAL ML/MIN/{1.73_M2}
GFR SERPL CREATININE-BSD FRML MDRD: ABNORMAL ML/MIN/{1.73_M2}
GLUCOSE BLD-MCNC: 207 MG/DL (ref 70–99)
HAV IGM SER IA-ACNC: NONREACTIVE
HCT VFR BLD CALC: 40 % (ref 41–53)
HEMOGLOBIN: 14.3 G/DL (ref 13.5–17.5)
HEPATITIS B CORE IGM ANTIBODY: NONREACTIVE
HEPATITIS B SURFACE ANTIGEN: NONREACTIVE
HEPATITIS C ANTIBODY: NONREACTIVE
HIV AG/AB: NONREACTIVE
IMMATURE GRANULOCYTES: ABNORMAL %
LYMPHOCYTES # BLD: 19 % (ref 24–44)
MCH RBC QN AUTO: 32 PG (ref 26–34)
MCHC RBC AUTO-ENTMCNC: 35.7 G/DL (ref 31–37)
MCV RBC AUTO: 89.8 FL (ref 80–100)
MONOCYTES # BLD: 7 % (ref 1–7)
NRBC AUTOMATED: ABNORMAL PER 100 WBC
PDW BLD-RTO: 13.6 % (ref 11.5–14.9)
PLATELET # BLD: 308 K/UL (ref 150–450)
PLATELET ESTIMATE: ABNORMAL
PMV BLD AUTO: 7 FL (ref 6–12)
POTASSIUM SERPL-SCNC: 4.6 MMOL/L (ref 3.7–5.3)
RBC # BLD: 4.46 M/UL (ref 4.5–5.9)
RBC # BLD: ABNORMAL 10*6/UL
SEG NEUTROPHILS: 71 % (ref 36–66)
SEGMENTED NEUTROPHILS ABSOLUTE COUNT: 5.7 K/UL (ref 1.3–9.1)
SODIUM BLD-SCNC: 135 MMOL/L (ref 135–144)
TOTAL PROTEIN: 7.5 G/DL (ref 6.4–8.3)
WBC # BLD: 8 K/UL (ref 3.5–11)
WBC # BLD: ABNORMAL 10*3/UL

## 2021-03-02 PROCEDURE — 36415 COLL VENOUS BLD VENIPUNCTURE: CPT

## 2021-03-02 PROCEDURE — 83516 IMMUNOASSAY NONANTIBODY: CPT

## 2021-03-02 PROCEDURE — 88350 IMFLUOR EA ADDL 1ANTB STN PX: CPT

## 2021-03-02 PROCEDURE — 88346 IMFLUOR 1ST 1ANTB STAIN PX: CPT

## 2021-03-02 PROCEDURE — 85025 COMPLETE CBC W/AUTO DIFF WBC: CPT

## 2021-03-02 PROCEDURE — 80074 ACUTE HEPATITIS PANEL: CPT

## 2021-03-02 PROCEDURE — 86480 TB TEST CELL IMMUN MEASURE: CPT

## 2021-03-02 PROCEDURE — 80053 COMPREHEN METABOLIC PANEL: CPT

## 2021-03-02 PROCEDURE — 87389 HIV-1 AG W/HIV-1&-2 AB AG IA: CPT

## 2021-03-05 LAB
QUANTI TB GOLD PLUS: NEGATIVE
QUANTI TB1 MINUS NIL: 0 IU/ML (ref 0–0.34)
QUANTI TB2 MINUS NIL: 0.02 IU/ML (ref 0–0.34)
QUANTIFERON MITOGEN: 9.94 IU/ML
QUANTIFERON NIL: 0.02 IU/ML

## 2021-03-09 LAB
SEND OUT REPORT: NORMAL
TEST NAME: NORMAL

## 2021-03-11 ENCOUNTER — OFFICE VISIT (OUTPATIENT)
Dept: INTERNAL MEDICINE CLINIC | Age: 81
End: 2021-03-11
Payer: MEDICARE

## 2021-03-11 VITALS
HEART RATE: 93 BPM | TEMPERATURE: 97.8 F | DIASTOLIC BLOOD PRESSURE: 80 MMHG | BODY MASS INDEX: 32.32 KG/M2 | SYSTOLIC BLOOD PRESSURE: 128 MMHG | OXYGEN SATURATION: 98 % | HEIGHT: 65 IN | WEIGHT: 194 LBS

## 2021-03-11 DIAGNOSIS — Z00.00 ROUTINE GENERAL MEDICAL EXAMINATION AT A HEALTH CARE FACILITY: Primary | ICD-10-CM

## 2021-03-11 PROCEDURE — G0438 PPPS, INITIAL VISIT: HCPCS | Performed by: INTERNAL MEDICINE

## 2021-03-11 ASSESSMENT — PATIENT HEALTH QUESTIONNAIRE - PHQ9
SUM OF ALL RESPONSES TO PHQ9 QUESTIONS 1 & 2: 6
4. FEELING TIRED OR HAVING LITTLE ENERGY: 1
9. THOUGHTS THAT YOU WOULD BE BETTER OFF DEAD, OR OF HURTING YOURSELF: 0
2. FEELING DOWN, DEPRESSED OR HOPELESS: 3
1. LITTLE INTEREST OR PLEASURE IN DOING THINGS: 3
SUM OF ALL RESPONSES TO PHQ QUESTIONS 1-9: 10
7. TROUBLE CONCENTRATING ON THINGS, SUCH AS READING THE NEWSPAPER OR WATCHING TELEVISION: 0
5. POOR APPETITE OR OVEREATING: 0
6. FEELING BAD ABOUT YOURSELF - OR THAT YOU ARE A FAILURE OR HAVE LET YOURSELF OR YOUR FAMILY DOWN: 3
8. MOVING OR SPEAKING SO SLOWLY THAT OTHER PEOPLE COULD HAVE NOTICED. OR THE OPPOSITE, BEING SO FIGETY OR RESTLESS THAT YOU HAVE BEEN MOVING AROUND A LOT MORE THAN USUAL: 0

## 2021-03-11 ASSESSMENT — COLUMBIA-SUICIDE SEVERITY RATING SCALE - C-SSRS
6. HAVE YOU EVER DONE ANYTHING, STARTED TO DO ANYTHING, OR PREPARED TO DO ANYTHING TO END YOUR LIFE?: NO
2. HAVE YOU ACTUALLY HAD ANY THOUGHTS OF KILLING YOURSELF?: NO
1. WITHIN THE PAST MONTH, HAVE YOU WISHED YOU WERE DEAD OR WISHED YOU COULD GO TO SLEEP AND NOT WAKE UP?: NO

## 2021-03-11 ASSESSMENT — LIFESTYLE VARIABLES: HOW OFTEN DO YOU HAVE A DRINK CONTAINING ALCOHOL: 0

## 2021-03-11 NOTE — PROGRESS NOTES
Visit Information    Have you changed or started any medications since your last visit including any over-the-counter medicines, vitamins, or herbal medicines? no   Are you having any side effects from any of your medications? -  no  Have you stopped taking any of your medications? Is so, why? -  no    Have you seen any other physician or provider since your last visit? No  Have you had any other diagnostic tests since your last visit? No  Have you been seen in the emergency room and/or had an admission to a hospital since we last saw you? No  Have you had your routine dental cleaning in the past 6 months? no    Have you activated your [employer] account? If not, what are your barriers?  No:      Patient Care Team:  Byron Roque MD as PCP - General (Internal Medicine)  Byron Roque MD as PCP - [facility]    Medical History Review  Past Medical, Family, and Social History reviewed and does not contribute to the patient presenting condition    Health Maintenance   Topic Date Due    DTaP/Tdap/Td vaccine (1 - Tdap) Never done    Shingles Vaccine (1 of 2) Never done    Pneumococcal 65+ years Vaccine (2 of 2 - PPSV23) 10/05/2018    Annual Wellness Visit (AWV)  Never done    COVID-19 Vaccine (2 of 2 - Moderna series) 03/17/2021    TSH testing  02/04/2022    Potassium monitoring  03/02/2022    Creatinine monitoring  03/02/2022    Flu vaccine  Completed    Hepatitis A vaccine  Aged Out    Hib vaccine  Aged Out    Meningococcal (ACWY) vaccine  Aged Out

## 2021-03-11 NOTE — PROGRESS NOTES
Hailey Bradley MD at [address]      JOINT REPLACEMENT      Right hip replacement    LUMBAR EPIDURAL STEROID INJECTION/CAUDAL  3/25/2019         UPPER GASTROINTESTINAL ENDOSCOPY N/A 2/10/2020    EGD BIOPSY performed by Hailey Bradley MD at [facility]       Family History   Problem Relation Age of Onset    No Known Problems Mother     No Known Problems Father     No Known Problems Brother        CareTeam (Including outside providers/suppliers regularly involved in providing care):   Patient Care Team:  Lorenso Dakins, MD as PCP - General (Internal Medicine)  Lorenso Dakins, MD as PCP - [facility] Provider    Wt Readings from Last 3 Encounters:   03/11/21 194 lb (88 kg)   02/23/21 190 lb (86.2 kg)   02/04/21 188 lb 6.4 oz (85.5 kg)     Vitals:    03/11/21 1043   BP: 128/80   Pulse: 93   Temp: 97.8 °F (36.6 °C)   SpO2: 98%   Weight: 194 lb (88 kg)   Height: 5' 5\" (1.651 m)     Body mass index is 32.28 kg/m². Based upon direct observation of the patient, evaluation of cognition reveals recent and remote memory intact.     General Appearance: alert and oriented to person, place and time, well developed and well- nourished, in no acute distress  Skin: warm and dry, no rash or erythema  Head: normocephalic and atraumatic  Eyes: pupils equal, round, and reactive to light, extraocular eye movements intact, conjunctivae normal  ENT: tympanic membrane, external ear and ear canal normal bilaterally, nose without deformity, nasal mucosa and turbinates normal without polyps  Neck: supple and non-tender without mass, no thyromegaly or thyroid nodules, no cervical lymphadenopathy  Pulmonary/Chest: clear to auscultation bilaterally- no wheezes, rales or rhonchi, normal air movement, no respiratory distress  Cardiovascular: normal rate, regular rhythm, normal S1 and S2, no murmurs, rubs, clicks, or gallops, distal pulses intact, no carotid bruits  Abdomen: soft, non-tender, non-distended, normal bowel sounds, no masses or organomegaly  Extremities: no cyanosis, clubbing or edema  Musculoskeletal: normal range of motion, no joint swelling, deformity or tenderness  Neurologic: reflexes normal and symmetric, no cranial nerve deficit, gait, coordination and speech normal  None      Patient's complete Health Risk Assessment and screening values have been reviewed and are found in Flowsheets. The following problems were reviewed today and where indicated follow up appointments were made and/or referrals ordered. Positive Risk Factor Screenings with Interventions:       Depression:  PHQ-2 Score: 6  PHQ-9 Total Score: 10    Severity:1-4 = minimal depression, 5-9 = mild depression, 10-14 = moderate depression, 15-19 = moderately severe depression, 20-27 = severe depression  Depression Interventions:  · Patient advised to follow-up in this office for further evaluation and treatment within 1 week      General Health and ACP:  General  In general, how would you say your health is?: Fair  In the past 7 days, have you experienced any of the following?  New or Increased Pain, New or Increased Fatigue, Loneliness, Social Isolation, Stress or Anger?: (!) New or Increased Pain, New or Increased Fatigue  Do you get the social and emotional support that you need?: Yes  Do you have a Living Will?: Yes  Advance Directives     Power of  Living Will ACP-Advance Directive ACP-Power of     Not on File Not on File Not on File Not on File      General Health Risk Interventions:  · Fatigue: regular exercise recommended- 3-5 times per week, 30-45 minutes per session    Health Habits/Nutrition:  Health Habits/Nutrition  Do you exercise for at least 20 minutes 2-3 times per week?: (!) No  Have you lost any weight without trying in the past 3 months?: No  Do you eat only one meal per day?: No  Body mass index: (!) 32.28  Health Habits/Nutrition Interventions:  · Inadequate physical activity:  patient agrees to exercise for at least 150 minutes/week    Hearing/Vision:  No exam data present  Hearing/Vision  Do you or your family notice any trouble with your hearing that hasn't been managed with hearing aids?: (!) Yes  Do you have difficulty driving, watching TV, or doing any of your daily activities because of your eyesight?: No  Have you had an eye exam within the past year?: Yes  Hearing/Vision Interventions:  · mood better     ADL:  ADLs  In the past 7 days, did you need help from others to perform any of the following everyday activities? Eating, dressing, grooming, bathing, toileting, or walking/balance?: (!) Walking/Balance  In the past 7 days, did you need help from others to take care of any of the following?  Laundry, housekeeping, banking/finances, shopping, telephone use, food preparation, transportation, or taking medications?: None  ADL Interventions:  · active iwth cane    Personalized Preventive Plan   Current Health Maintenance Status  Immunization History   Administered Date(s) Administered    COVID-19, Moderna, PF, 100mcg/0.5mL 02/17/2021    Influenza Virus Vaccine 11/06/2020    Influenza, High Dose (Fluzone 65 yrs and older) 10/05/2017, 10/01/2018, 09/25/2019    Influenza, Quadv, adjuvanted, 65 yrs +, IM, PF (Fluad) 11/06/2020    Pneumococcal Conjugate 13-valent (Chrissie Quiet) 10/05/2017        Health Maintenance   Topic Date Due    DTaP/Tdap/Td vaccine (1 - Tdap) Never done    Shingles Vaccine (1 of 2) Never done    Pneumococcal 65+ years Vaccine (2 of 2 - PPSV23) 10/05/2018    Annual Wellness Visit (AWV)  Never done    COVID-19 Vaccine (2 of 2 - Moderna series) 03/17/2021    TSH testing  02/04/2022    Potassium monitoring  03/02/2022    Creatinine monitoring  03/02/2022    Flu vaccine  Completed    Hepatitis A vaccine  Aged Out    Hib vaccine  Aged Out    Meningococcal (ACWY) vaccine  Aged Out     Recommendations for [employer] Due: see orders and patient instructions/AVS.  . Recommended screening schedule for the next 5-10 years is provided to the patient in written form: see Patient Instructions/AVS.    There are no diagnoses linked to this encounter.          Needs prevnar  21  But pt in middle of covid vaccine  adised to come back in next viist in two amonth to get

## 2021-03-12 ENCOUNTER — TELEPHONE (OUTPATIENT)
Dept: INTERNAL MEDICINE CLINIC | Age: 81
End: 2021-03-12

## 2021-03-12 NOTE — TELEPHONE ENCOUNTER
Clarisse Carlson, called and wanted to know if Jeane Saint should be on Levothyroxine for his thyroid?  She forgot to mention this yesterday at his appt

## 2021-03-15 NOTE — TELEPHONE ENCOUNTER
Crista Muro notified, she will take him to get the lab done, and call the office to make a follow up

## 2021-05-06 DIAGNOSIS — R21 BLISTERING ERUPTION: ICD-10-CM

## 2021-05-06 NOTE — TELEPHONE ENCOUNTER
Last visit: 2/23/21  Last Med refill  2/23/21  Does patient have enough medication for     Next Visit Date:  Future Appointments   Date Time Provider Ally Treadwell   5/10/2021  1:30 PM Jeromy Bernabe DPM [address]   5/17/2021  1:30 PM Toni Marin MD [facility]   9/2/2021 10:45 AM Caitlin Up MD  [address] Maintenance   Topic Date Due    DTaP/Tdap/Td vaccine (1 - Tdap) Never done    Shingles Vaccine (1 of 2) Never done    Pneumococcal 65+ years Vaccine (2 of 2 - PPSV23) 10/05/2018    COVID-19 Vaccine (2 - Moderna 2-dose series) 03/17/2021    TSH testing  02/04/2022    Potassium monitoring  03/02/2022    Creatinine monitoring  03/02/2022    Annual Wellness Visit (AWV)  03/12/2022    Flu vaccine  Completed    Hepatitis A vaccine  Aged Out    Hib vaccine  Aged Out    Meningococcal (ACWY) vaccine  Aged Out       Hemoglobin A1C (%)   Date Value   02/04/2021 7.9   12/11/2019 7.3   06/11/2019 7.1             ( goal A1C is < 7)   No results found for: LABMICR  LDL Cholesterol (mg/dL)   Date Value   10/09/2017 58       (goal LDL is <100)   AST (U/L)   Date Value   03/02/2021 19     ALT (U/L)   Date Value   03/02/2021 17     BUN (mg/dL)   Date Value   03/02/2021 11     BP Readings from Last 3 Encounters:   03/11/21 128/80   02/23/21 138/81   02/04/21 126/78          (goal 120/80)    All Future Testing planned in [facility]  Lab Frequency Next Occurrence               Patient Active Problem List:     Chronic midline low back pain with sciatica     Type 2 diabetes mellitus without complication, without long-term current use of insulin (HCC)     Hypothyroidism     Peripheral polyneuropathy     History of left hip replacement     Arthritis of right hip     Acquired spondylolisthesis     Scoliosis (and kyphoscoliosis), idiopathic     Lumbar degenerative disc disease     Lumbar radicular pain     Hypotension     Closed fracture of left tibial plateau with routine healing     Complex tear of medial meniscus of left knee as current injury     Complex tear of lateral meniscus of left knee as current injury     GI bleed     Essential hypertension     Rectal bleeding     Elevated alkaline phosphatase level     Anemia     Spinal stenosis of lumbar region with neurogenic claudication     Scabies infestation

## 2021-06-08 ENCOUNTER — OFFICE VISIT (OUTPATIENT)
Dept: PODIATRY | Age: 81
End: 2021-06-08
Payer: COMMERCIAL

## 2021-06-08 VITALS — WEIGHT: 200 LBS | HEIGHT: 68 IN | BODY MASS INDEX: 30.31 KG/M2

## 2021-06-08 DIAGNOSIS — M79.674 PAIN OF TOES OF BOTH FEET: ICD-10-CM

## 2021-06-08 DIAGNOSIS — B35.1 ONYCHOMYCOSIS OF TOENAIL: Primary | ICD-10-CM

## 2021-06-08 DIAGNOSIS — E11.42 DIABETIC POLYNEUROPATHY ASSOCIATED WITH TYPE 2 DIABETES MELLITUS (HCC): ICD-10-CM

## 2021-06-08 DIAGNOSIS — E11.51 TYPE 2 DIABETES MELLITUS WITH PERIPHERAL VASCULAR DISEASE (HCC): ICD-10-CM

## 2021-06-08 DIAGNOSIS — M79.675 PAIN OF TOES OF BOTH FEET: ICD-10-CM

## 2021-06-08 PROCEDURE — 11721 DEBRIDE NAIL 6 OR MORE: CPT | Performed by: PODIATRIST

## 2021-06-10 ASSESSMENT — ENCOUNTER SYMPTOMS
COLOR CHANGE: 0
BACK PAIN: 0
NAUSEA: 0
SHORTNESS OF BREATH: 0
DIARRHEA: 0

## 2021-06-10 NOTE — PROGRESS NOTES
SUBJECTIVE: Bay Leigh is a 78 y.o. male who returns to the office with chief complaint of painful fungal toenails. Patient relates toe nails are thickened/difficult to trim as well as painful with ambulation and with shoe gear. Chief Complaint   Patient presents with    Nail Problem     nail trim/last saw Alexey Orellana 3/11/21    Diabetes     last a1c 7.9     Review of Systems   Constitutional: Negative for activity change, appetite change, chills, diaphoresis, fatigue and fever. Respiratory: Negative for shortness of breath. Cardiovascular: Negative for leg swelling. Gastrointestinal: Negative for diarrhea and nausea. Endocrine: Negative for cold intolerance, heat intolerance and polyuria. Musculoskeletal: Positive for arthralgias and gait problem. Negative for back pain, joint swelling and myalgias. Skin: Negative for color change, pallor, rash and wound. Allergic/Immunologic: Negative for environmental allergies and food allergies. Neurological: Negative for dizziness, weakness, light-headedness and numbness. Hematological: Does not bruise/bleed easily. Psychiatric/Behavioral: Negative for behavioral problems, confusion and self-injury. The patient is not nervous/anxious. OBJECTIVE: Clinical evaluation of patient reveals nails 1,2,3,4,5 of the right foot and nails 1,2,3,4,5, of the left foot to present with thickness, elongation, discoloration, brittleness, and subungual debris. There was pain with palpation and debridement of the toenails of the bilateral feet. No open lesions noted to either foot today. The right DP pulse is palpable. The left DP pulse is palpable. The right PT pulse is not palpable. The left PT pulse is not palpable. Protective sensation is absent to the right plantar foot as noted with a 5.07 Runnells-Shubham monofilament. Protective sensation is absent to the left plantar foot as noted with a 5.07 Runnells-Shubham monofilament.    HbA1c: 7.9

## 2021-09-21 ENCOUNTER — HOSPITAL ENCOUNTER (OUTPATIENT)
Age: 81
Setting detail: SPECIMEN
Discharge: HOME | End: 2021-09-21
Payer: COMMERCIAL

## 2021-09-21 ENCOUNTER — OFFICE VISIT (OUTPATIENT)
Dept: FAMILY MEDICINE CLINIC | Age: 81
End: 2021-09-21
Payer: COMMERCIAL

## 2021-09-21 ENCOUNTER — TELEPHONE (OUTPATIENT)
Dept: FAMILY MEDICINE CLINIC | Age: 81
End: 2021-09-21

## 2021-09-21 VITALS
DIASTOLIC BLOOD PRESSURE: 70 MMHG | WEIGHT: 190 LBS | BODY MASS INDEX: 28.89 KG/M2 | HEART RATE: 92 BPM | SYSTOLIC BLOOD PRESSURE: 110 MMHG | OXYGEN SATURATION: 97 % | TEMPERATURE: 97.1 F

## 2021-09-21 DIAGNOSIS — E03.9 ADULT HYPOTHYROIDISM: Primary | ICD-10-CM

## 2021-09-21 DIAGNOSIS — E78.5 DYSLIPIDEMIA: ICD-10-CM

## 2021-09-21 DIAGNOSIS — R53.81 PHYSICAL DEBILITY: ICD-10-CM

## 2021-09-21 DIAGNOSIS — I48.91 ATRIAL FIBRILLATION, UNSPECIFIED TYPE (HCC): ICD-10-CM

## 2021-09-21 DIAGNOSIS — I10 HTN, GOAL BELOW 130/80: ICD-10-CM

## 2021-09-21 DIAGNOSIS — R40.4 TRANSIENT ALTERATION OF AWARENESS: ICD-10-CM

## 2021-09-21 DIAGNOSIS — Z71.3 DIETARY COUNSELING: ICD-10-CM

## 2021-09-21 DIAGNOSIS — E11.65 TYPE 2 DIABETES MELLITUS WITH HYPERGLYCEMIA, WITHOUT LONG-TERM CURRENT USE OF INSULIN (HCC): ICD-10-CM

## 2021-09-21 DIAGNOSIS — F33.1 MODERATE EPISODE OF RECURRENT MAJOR DEPRESSIVE DISORDER (HCC): ICD-10-CM

## 2021-09-21 DIAGNOSIS — J44.9 CHRONIC OBSTRUCTIVE PULMONARY DISEASE, UNSPECIFIED COPD TYPE (HCC): ICD-10-CM

## 2021-09-21 DIAGNOSIS — L57.0 ACTINIC KERATOSIS: ICD-10-CM

## 2021-09-21 DIAGNOSIS — K21.9 GASTROESOPHAGEAL REFLUX DISEASE WITHOUT ESOPHAGITIS: ICD-10-CM

## 2021-09-21 DIAGNOSIS — Z76.89 ESTABLISHING CARE WITH NEW DOCTOR, ENCOUNTER FOR: Primary | ICD-10-CM

## 2021-09-21 LAB
-: ABNORMAL
ABSOLUTE EOS #: 0.07 K/UL (ref 0–0.44)
ABSOLUTE IMMATURE GRANULOCYTE: <0.03 K/UL (ref 0–0.3)
ABSOLUTE LYMPH #: 1.18 K/UL (ref 1.1–3.7)
ABSOLUTE MONO #: 0.5 K/UL (ref 0.1–1.2)
ALBUMIN SERPL-MCNC: 4.1 G/DL (ref 3.5–5.2)
ALT SERPL-CCNC: 23 U/L (ref 5–41)
AMORPHOUS: ABNORMAL
ANION GAP SERPL CALCULATED.3IONS-SCNC: 14 MMOL/L (ref 9–17)
AST SERPL-CCNC: 25 U/L
BACTERIA: ABNORMAL
BASOPHILS # BLD: 1 % (ref 0–2)
BASOPHILS ABSOLUTE: 0.06 K/UL (ref 0–0.2)
BILIRUBIN URINE: NEGATIVE
BUN BLDV-MCNC: 15 MG/DL (ref 8–23)
BUN/CREAT BLD: ABNORMAL (ref 9–20)
CALCIUM SERPL-MCNC: 9.7 MG/DL (ref 8.6–10.4)
CASTS UA: ABNORMAL /LPF
CHLORIDE BLD-SCNC: 105 MMOL/L (ref 98–107)
CHOLESTEROL/HDL RATIO: 6.1
CHOLESTEROL: 206 MG/DL
CO2: 19 MMOL/L (ref 20–31)
COLOR: YELLOW
COMMENT UA: ABNORMAL
CREAT SERPL-MCNC: 0.55 MG/DL (ref 0.7–1.2)
CREATININE URINE: 87.9 MG/DL (ref 39–259)
CRYSTALS, UA: ABNORMAL /HPF
DIFFERENTIAL TYPE: ABNORMAL
EOSINOPHILS RELATIVE PERCENT: 1 % (ref 1–4)
EPITHELIAL CELLS UA: ABNORMAL /HPF
ESTIMATED AVERAGE GLUCOSE: 151 MG/DL
GFR AFRICAN AMERICAN: >60 ML/MIN
GFR NON-AFRICAN AMERICAN: >60 ML/MIN
GFR SERPL CREATININE-BSD FRML MDRD: ABNORMAL ML/MIN/{1.73_M2}
GFR SERPL CREATININE-BSD FRML MDRD: ABNORMAL ML/MIN/{1.73_M2}
GLUCOSE BLD-MCNC: 157 MG/DL (ref 70–99)
GLUCOSE URINE: NEGATIVE
HBA1C MFR BLD: 6.9 % (ref 4–6)
HCT VFR BLD CALC: 43.7 % (ref 40.7–50.3)
HDLC SERPL-MCNC: 34 MG/DL
HEMOGLOBIN: 14.8 G/DL (ref 13–17)
IMMATURE GRANULOCYTES: 0 %
KETONES, URINE: NEGATIVE
LDL CHOLESTEROL: 153 MG/DL (ref 0–130)
LEUKOCYTE ESTERASE, URINE: ABNORMAL
LYMPHOCYTES # BLD: 18 % (ref 24–43)
MAGNESIUM: 1.9 MG/DL (ref 1.6–2.6)
MCH RBC QN AUTO: 31.1 PG (ref 25.2–33.5)
MCHC RBC AUTO-ENTMCNC: 33.9 G/DL (ref 28.4–34.8)
MCV RBC AUTO: 91.8 FL (ref 82.6–102.9)
MICROALBUMIN/CREAT 24H UR: 525 MG/L
MICROALBUMIN/CREAT UR-RTO: 597 MCG/MG CREAT
MONOCYTES # BLD: 7 % (ref 3–12)
MUCUS: ABNORMAL
NITRITE, URINE: NEGATIVE
NRBC AUTOMATED: 0 PER 100 WBC
OTHER OBSERVATIONS UA: ABNORMAL
PDW BLD-RTO: 12.9 % (ref 11.8–14.4)
PH UA: 6.5 (ref 5–8)
PHOSPHORUS: 3.4 MG/DL (ref 2.5–4.5)
PLATELET # BLD: 271 K/UL (ref 138–453)
PLATELET ESTIMATE: ABNORMAL
PMV BLD AUTO: 9.7 FL (ref 8.1–13.5)
POTASSIUM SERPL-SCNC: 4.1 MMOL/L (ref 3.7–5.3)
PROTEIN UA: ABNORMAL
RBC # BLD: 4.76 M/UL (ref 4.21–5.77)
RBC # BLD: ABNORMAL 10*6/UL
RBC UA: ABNORMAL /HPF
RENAL EPITHELIAL, UA: ABNORMAL /HPF
SEG NEUTROPHILS: 73 % (ref 36–65)
SEGMENTED NEUTROPHILS ABSOLUTE COUNT: 4.89 K/UL (ref 1.5–8.1)
SODIUM BLD-SCNC: 138 MMOL/L (ref 135–144)
SPECIFIC GRAVITY UA: 1.02 (ref 1–1.03)
THYROXINE, FREE: 0.73 NG/DL (ref 0.93–1.7)
TRICHOMONAS: ABNORMAL
TRIGL SERPL-MCNC: 96 MG/DL
TSH SERPL DL<=0.05 MIU/L-ACNC: 12.43 MIU/L (ref 0.3–5)
TURBIDITY: ABNORMAL
URINE HGB: NEGATIVE
UROBILINOGEN, URINE: NORMAL
VLDLC SERPL CALC-MCNC: ABNORMAL MG/DL (ref 1–30)
WBC # BLD: 6.7 K/UL (ref 3.5–11.3)
WBC # BLD: ABNORMAL 10*3/UL
WBC UA: ABNORMAL /HPF
YEAST: ABNORMAL

## 2021-09-21 PROCEDURE — G8417 CALC BMI ABV UP PARAM F/U: HCPCS | Performed by: FAMILY MEDICINE

## 2021-09-21 PROCEDURE — 1123F ACP DISCUSS/DSCN MKR DOCD: CPT | Performed by: FAMILY MEDICINE

## 2021-09-21 PROCEDURE — 86403 PARTICLE AGGLUT ANTBDY SCRN: CPT

## 2021-09-21 PROCEDURE — 99204 OFFICE O/P NEW MOD 45 MIN: CPT | Performed by: FAMILY MEDICINE

## 2021-09-21 PROCEDURE — 3023F SPIROM DOC REV: CPT | Performed by: FAMILY MEDICINE

## 2021-09-21 PROCEDURE — 82043 UR ALBUMIN QUANTITATIVE: CPT

## 2021-09-21 PROCEDURE — 81001 URINALYSIS AUTO W/SCOPE: CPT

## 2021-09-21 PROCEDURE — 87086 URINE CULTURE/COLONY COUNT: CPT

## 2021-09-21 PROCEDURE — G8427 DOCREV CUR MEDS BY ELIG CLIN: HCPCS | Performed by: FAMILY MEDICINE

## 2021-09-21 PROCEDURE — 82570 ASSAY OF URINE CREATININE: CPT

## 2021-09-21 PROCEDURE — 4040F PNEUMOC VAC/ADMIN/RCVD: CPT | Performed by: FAMILY MEDICINE

## 2021-09-21 PROCEDURE — 3051F HG A1C>EQUAL 7.0%<8.0%: CPT | Performed by: FAMILY MEDICINE

## 2021-09-21 PROCEDURE — 87186 SC STD MICRODIL/AGAR DIL: CPT

## 2021-09-21 PROCEDURE — 17000 DESTRUCT PREMALG LESION: CPT | Performed by: FAMILY MEDICINE

## 2021-09-21 PROCEDURE — 1036F TOBACCO NON-USER: CPT | Performed by: FAMILY MEDICINE

## 2021-09-21 PROCEDURE — G8926 SPIRO NO PERF OR DOC: HCPCS | Performed by: FAMILY MEDICINE

## 2021-09-21 RX ORDER — OMEPRAZOLE 20 MG/1
20 CAPSULE, DELAYED RELEASE ORAL 2 TIMES DAILY
Qty: 60 CAPSULE | Refills: 3 | Status: SHIPPED | OUTPATIENT
Start: 2021-09-21

## 2021-09-21 RX ORDER — LEVOTHYROXINE SODIUM 0.05 MG/1
50 TABLET ORAL DAILY
Qty: 30 TABLET | Refills: 1 | Status: SHIPPED | OUTPATIENT
Start: 2021-09-21 | End: 2021-11-17 | Stop reason: SDUPTHER

## 2021-09-21 RX ORDER — MIRTAZAPINE 15 MG/1
15 TABLET, FILM COATED ORAL NIGHTLY
Qty: 30 TABLET | Refills: 1 | Status: SHIPPED | OUTPATIENT
Start: 2021-09-21 | End: 2021-11-17 | Stop reason: SDUPTHER

## 2021-09-21 SDOH — ECONOMIC STABILITY: FOOD INSECURITY: WITHIN THE PAST 12 MONTHS, YOU WORRIED THAT YOUR FOOD WOULD RUN OUT BEFORE YOU GOT MONEY TO BUY MORE.: NEVER TRUE

## 2021-09-21 SDOH — ECONOMIC STABILITY: FOOD INSECURITY: WITHIN THE PAST 12 MONTHS, THE FOOD YOU BOUGHT JUST DIDN'T LAST AND YOU DIDN'T HAVE MONEY TO GET MORE.: NEVER TRUE

## 2021-09-21 ASSESSMENT — PATIENT HEALTH QUESTIONNAIRE - PHQ9
7. TROUBLE CONCENTRATING ON THINGS, SUCH AS READING THE NEWSPAPER OR WATCHING TELEVISION: 1
3. TROUBLE FALLING OR STAYING ASLEEP: 2
6. FEELING BAD ABOUT YOURSELF - OR THAT YOU ARE A FAILURE OR HAVE LET YOURSELF OR YOUR FAMILY DOWN: 2
SUM OF ALL RESPONSES TO PHQ QUESTIONS 1-9: 13
SUM OF ALL RESPONSES TO PHQ9 QUESTIONS 1 & 2: 3
9. THOUGHTS THAT YOU WOULD BE BETTER OFF DEAD, OR OF HURTING YOURSELF: 3
2. FEELING DOWN, DEPRESSED OR HOPELESS: 3
1. LITTLE INTEREST OR PLEASURE IN DOING THINGS: 0
SUM OF ALL RESPONSES TO PHQ QUESTIONS 1-9: 10
4. FEELING TIRED OR HAVING LITTLE ENERGY: 2
10. IF YOU CHECKED OFF ANY PROBLEMS, HOW DIFFICULT HAVE THESE PROBLEMS MADE IT FOR YOU TO DO YOUR WORK, TAKE CARE OF THINGS AT HOME, OR GET ALONG WITH OTHER PEOPLE: 1
8. MOVING OR SPEAKING SO SLOWLY THAT OTHER PEOPLE COULD HAVE NOTICED. OR THE OPPOSITE, BEING SO FIGETY OR RESTLESS THAT YOU HAVE BEEN MOVING AROUND A LOT MORE THAN USUAL: 0
SUM OF ALL RESPONSES TO PHQ QUESTIONS 1-9: 13
5. POOR APPETITE OR OVEREATING: 0

## 2021-09-21 ASSESSMENT — SOCIAL DETERMINANTS OF HEALTH (SDOH): HOW HARD IS IT FOR YOU TO PAY FOR THE VERY BASICS LIKE FOOD, HOUSING, MEDICAL CARE, AND HEATING?: NOT HARD AT ALL

## 2021-09-21 NOTE — TELEPHONE ENCOUNTER
----- Message from Yeny Chase MA sent at 9/21/2021 10:10 AM EDT -----  Subject: Message to Provider    QUESTIONS  Information for Provider? Steve Ramirez wants to make sure pcp orders thyroid   level as it was elevated last time it was checked. ---------------------------------------------------------------------------  --------------  Griselda LINK  What is the best way for the office to contact you? OK to leave message on   voicemail  Preferred Call Back Phone Number? [phone]  ---------------------------------------------------------------------------  --------------  SCRIPT ANSWERS  Relationship to Patient? Third Party  Representative Name?  Carmenza Ramirez

## 2021-09-21 NOTE — PATIENT INSTRUCTIONS
Learning About Obesity  What is obesity? Obesity means having an unhealthy amount of body fat. This puts your health in danger. It can lead to other health problems, such as type 2 diabetes and high blood pressure. How do you know if your weight is in the obesity range? To know if your weight is in the obesity range, your doctor looks at your body mass index (BMI) and waist size. BMI is a number that is calculated from your weight and your height. To figure out your BMI for yourself, you can use an online tool, such as http://www.chua.com/ on the [employer]. If your BMI is 30.0 or higher, it falls within the obesity range. Keep in mind that BMI and waist size are only guides. They are not tools to determine your ideal body weight. What causes obesity? When you take in more calories than you burn off, you gain weight. How you eat, how active you are, and other things affect how your body uses calories and whether you gain weight. If you have family members who have too much body fat, you may have inherited a tendency to gain weight. And your family also helps form your eating and lifestyle habits, which can lead to obesity. Also, our busy lives make it harder to plan and cook healthy meals. For many of us, it's easier to reach for prepared foods, go out to eat, or go to the drive-through. But these foods are often high in saturated fat and calories. Portions are often too large. What can you do to reach a healthy weight? Focus on health, not diets. Diets are hard to stay on and don't work in the long run. It is very hard to stay with a diet that includes lots of big changes in your eating habits. Instead of a diet, focus on lifestyle changes that will improve your health and achieve the right balance of energy and calories. To lose weight, you need to burn more calories than you take in.  You can do it by eating healthy foods in reasonable amounts and becoming more active, even a little bit every day. Making small changes over time can add up to a lot. Make a plan for change. Many people have found that naming their reasons for change and staying focused on their plan can make a big difference. Work with your doctor to create a plan that is right for you. · Ask yourself: Lum Mantle are my personal, most powerful reasons for wanting this change? What will my life look like when I've made the change? \"  · Set your long-term goal. Make it specific, such as \"I will lose x pounds. \"  · Break your long-term goal into smaller, short-term goals. Make these small steps specific and within your reach, things you know you can do. These steps are what keep you going from day to day. Talk with your doctor about other weight-loss options. If you have a BMI in a certain range and have not been able to lose weight with diet and exercise, medicine or surgery may be an option for you. Before your doctor will prescribe medicines or surgery, he or she will probably want you to be more active and follow your healthy eating plan for a period of time. These habits are key lifelong changes for managing your weight, with or without other medical treatment. And these changes can help you avoid weight-related health problems. How can you stay on your plan for change? Be ready. Choose to start during a time when there are few events like holidays, social events, and high-stress periods. These events might trigger slip-ups. Decide on your first few steps. Most people have more success when they make small changes, one step at a time. For example, you might switch a daily candy bar to a piece of fruit, walk 10 minutes more, or add more vegetables to a meal.  Line up your support people. Make sure you're not going to be alone as you make this change. Connect with people who understand how important it is to you.  Ask family members and friends for help in keeping It's also a good idea to know your test results and keep a list of the medicines you take. Where can you learn more? Go to https://RedCappepiceweb.[employer]. org and sign in to your [employer] account. Enter N111 in the [facility] box to learn more about \"Learning About Obesity. \"     If you do not have an account, please click on the \"Sign Up Now\" link. Current as of: March 17, 2021               Content Version: 12.9  © 3242-3454 [employer], [employer]. Care instructions adapted under license by [facility] ([facility]). If you have questions about a medical condition or this instruction, always ask your healthcare professional. [address] any warranty or liability for your use of this information.

## 2021-09-21 NOTE — PROGRESS NOTES
Progress Note    Sara Duarte is a 78 y.o.  male who presents today alone for evaluation of   Chief Complaint   Patient presents with    New Patient           HPI:   Patient is here to [facility] care today. Patient PMH OA, DM, HTN, and hypothyroidism. Patient [address] back surgery, cataract removal, hernia repair, and right HAI. Patient fhx mom and dad obesity. Patient is a former smoker. Patient denies regular EtOH consumption. Patient denies illicits. Patient reports depression. Patient reports associated insomnia. Patient denies SI/HI. Patient reports anxiety. Patient denies specific diet. Patient denies regular aerobic activity weekly. PHQ-9 Total Score: 13 (9/21/2021 11:22 AM)  Thoughts that you would be better off dead, or of hurting yourself in some way: 3 (9/21/2021 11:22 AM)    Patient last HbA1c 7.9 2/2021. Patient states he does have n/t in his feet. Patient denies Carmina Found. Patient denies exertional calf cramping. Patient has recurrent memory loss. Patient states he has difficulty recalling recent events. Patient states remote events he can remember. Patient reports acid reflux. Patient states he has burning after eating. Patient has associated belching. Patient denies blood in stool. Patient denies dark tarry stools. Patient states it is intermittent. Patient has relief with tums and cold water. Patient reports scaling lesion over anterior scalp. Patient states it has been present for about 1 yr. Patient reports history of sun exposure.     Health Maintenance Due   Topic Date Due    DTaP/Tdap/Td vaccine (1 - Tdap) Never done    Shingles Vaccine (1 of 2) Never done    Pneumococcal 65+ years Vaccine (2 of 2 - PPSV23) 10/05/2018    Flu vaccine (1) 09/01/2021        Current Medications:     Current Outpatient Medications   Medication Sig Dispense Refill    mirtazapine (REMERON) 15 MG tablet Take 1 tablet by mouth nightly 30 tablet 1    omeprazole (PRILOSEC) 20 MG delayed release capsule Take 1 capsule by mouth 2 times daily 60 capsule 3     Current Facility-Administered Medications   Medication Dose Route Frequency Provider Last Rate Last Admin    lidocaine-EPINEPHrine 1 percent-1:[id number] injection 1 mL  1 mL IntraDERmal Once Petty Portillo MD           Allergies:      Allergies   Allergen Reactions    Lisinopril Other (See Comments)     COUGH         Medical History:     Past Medical History:   Diagnosis Date    Arthritis     Diabetes mellitus ([address]     HISTORY OF, NO MEDS CURRENTLY    Hypertension     Hypothyroid     SOB (shortness of breath) on exertion        Past Surgical History:   Procedure Laterality Date    BACK SURGERY      Patient states he isnt sure if he had back surgery    CATARACT REMOVAL WITH IMPLANT      COLONOSCOPY N/A 2/10/2020    COLONOSCOPY WITH BIOPSY performed by Oma Jones MD at [address]      JOINT REPLACEMENT      Right hip replacement    LUMBAR EPIDURAL STEROID INJECTION/CAUDAL  3/25/2019         UPPER GASTROINTESTINAL ENDOSCOPY N/A 2/10/2020    EGD BIOPSY performed by Oma Jones MD at [address] [place] [address]       Family History   Problem Relation Age of Onset    Obesity Mother     Obesity Father     No Known Problems Brother         Social History:     Social History     Socioeconomic History    Marital status: Single     Spouse name: Not on file    Number of children: 0    Years of education: Not on file    Highest education level: Not on file   Occupational History    Not on file   Tobacco Use    Smoking status: Former Smoker     Packs/day: 1.50     Years: 40.00     Pack years: 60.00     Types: Cigarettes     Quit date: 1988     Years since quittin.0    Smokeless tobacco: Never Used   Vaping Use    Vaping Use: Never used   Substance and Sexual Activity    Alcohol use: No    Drug use: No    Sexual activity: Not on file   Other Topics Concern    Not on file   Social History Narrative  Not on file     Social Determinants of Health     Financial Resource Strain: Low Risk     Difficulty of Paying Living Expenses: Not hard at all   Food Insecurity: No Food Insecurity    Worried About Running Out of Food in the Last Year: Never true    [address] in the Last Year: Never true   Transportation Needs:     Lack of Transportation (Medical):  Lack of Transportation (Non-Medical):    Physical Activity:     Days of Exercise per Week:     Minutes of Exercise per Session:    Stress:     Feeling of Stress :    Social Connections:     Frequency of Communication with Friends and Family:     Frequency of Social Gatherings with Friends and Family:     Attends Jewish Services:     Active Member of Clubs or Organizations:     Attends Club or Organization Meetings:     Marital Status:    Intimate Partner Violence:     Fear of Current or Ex-Partner:     Emotionally Abused:     Physically Abused:     Sexually Abused:         ROS:     Constitutional: No fevers, chills, fatigue. ENT: No nasal congestion or sore throat  Respiratory: No difficulty in breathing or cough. Cardiovascular: No chest pain, palpitations or shortness of breath  Gastrointestinal: No abdominal pain or change in bowel movements. Genitourinary: No change in urinary frequency or dysuria. Skin: No rashes; + skin lesion - scalp. Neurological: No weakness. No headaches. Last Filed Vitals:  /70   Pulse 92   Temp 97.1 °F (36.2 °C) (Temporal)   Wt 190 lb (86.2 kg)   SpO2 97%   BMI 28.89 kg/m²      Physical Examination:     GENERAL APPEARANCE: in no acute distress, well developed, well nourished. HEAD: normocephalic, atraumatic. EYES: extraocular movement intact (EOMI), pupils equal, round, reactive to light and accommodation. EARS: normal, tympanic membrane intact, clear, auditory canal clear. NOSE: nares patent, no erythema, sinuses nontender bilaterally, no rhinorrhea.    ORAL CAVITY: mucosa moist, no lesions. THROAT: clear, no mass, no exudate. NECK/THYROID: neck supple, full range of motion, no thyromegaly. HEART: no murmurs, regular rate and rhythm, S1, S2 normal.   LUNGS: clear to auscultation bilaterally, no wheezes, rales, rhonchi. ABDOMEN: normal, bowel sounds present, soft, nontender, nondistended, no rebound guarding or rigidity  SKIN: +erythematous scaling lesion over anterior scalp.     Recent Labs/ In Office Testing/ Radiograph review:     Hospital Outpatient Visit on 03/02/2021   Component Date Value Ref Range Status    WBC 03/02/2021 8.0  3.5 - 11.0 k/uL Final    RBC 03/02/2021 4.46* 4.5 - 5.9 m/uL Final    Hemoglobin 03/02/2021 14.3  13.5 - 17.5 g/dL Final    Hematocrit 03/02/2021 40.0* 41 - 53 % Final    MCV 03/02/2021 89.8  80 - 100 fL Final    MCH 03/02/2021 32.0  26 - 34 pg Final    MCHC 03/02/2021 35.7  31 - 37 g/dL Final    RDW 03/02/2021 13.6  11.5 - 14.9 % Final    Platelets 56/18/9232 308  150 - 450 k/uL Final    MPV 03/02/2021 7.0  6.0 - 12.0 fL Final    NRBC Automated 03/02/2021 NOT REPORTED  per 100 WBC Final    Differential Type 03/02/2021 NOT REPORTED   Final    Seg Neutrophils 03/02/2021 71* 36 - 66 % Final    Lymphocytes 03/02/2021 19* 24 - 44 % Final    Monocytes 03/02/2021 7  1 - 7 % Final    Eosinophils % 03/02/2021 2  0 - 4 % Final    Basophils 03/02/2021 1  0 - 2 % Final    Immature Granulocytes 03/02/2021 NOT REPORTED  0 % Final    Segs Absolute 03/02/2021 5.70  1.3 - 9.1 k/uL Final    Absolute Lymph # 03/02/2021 1.50  1.0 - 4.8 k/uL Final    Absolute Mono # 03/02/2021 0.50  0.1 - 1.3 k/uL Final    Absolute Eos # 03/02/2021 0.10  0.0 - 0.4 k/uL Final    Basophils Absolute 03/02/2021 0.10  0.0 - 0.2 k/uL Final    Absolute Immature Granulocyte 03/02/2021 NOT REPORTED  0.00 - 0.30 k/uL Final    WBC Morphology 03/02/2021 NOT REPORTED   Final    RBC Morphology 03/02/2021 NOT REPORTED   Final    Platelet Estimate 87/50/9296 NOT REPORTED Final    Glucose 03/02/2021 207* 70 - 99 mg/dL Final    BUN 03/02/2021 11  8 - 23 mg/dL Final    CREATININE 03/02/2021 0.56* 0.70 - 1.20 mg/dL Final    Bun/Cre Ratio 03/02/2021 NOT REPORTED  9 - 20 Final    Calcium 03/02/2021 9.7  8.6 - 10.4 mg/dL Final    Sodium 03/02/2021 135  135 - 144 mmol/L Final    Potassium 03/02/2021 4.6  3.7 - 5.3 mmol/L Final    Chloride 03/02/2021 100  98 - 107 mmol/L Final    CO2 03/02/2021 25  20 - 31 mmol/L Final    Anion Gap 03/02/2021 10  9 - 17 mmol/L Final    Alkaline Phosphatase 03/02/2021 133* 40 - 129 U/L Final    ALT 03/02/2021 17  5 - 41 U/L Final    AST 03/02/2021 19  <40 U/L Final    Total Bilirubin 03/02/2021 0.54  0.3 - 1.2 mg/dL Final    Total Protein 03/02/2021 7.5  6.4 - 8.3 g/dL Final    Albumin 03/02/2021 4.1  3.5 - 5.2 g/dL Final    Albumin/Globulin Ratio 03/02/2021 NOT REPORTED  1.0 - 2.5 Final    GFR Non- 03/02/2021 >60  >60 mL/min Final    GFR  03/02/2021 >60  >60 mL/min Final    GFR Comment 03/02/2021        Final    Comment: Average GFR for 79or more years old:   76 mL/min/1.73sq m  Chronic Kidney Disease:   <60 mL/min/1.73sq m  Kidney failure:   <15 mL/min/1.73sq m              eGFR calculated using average adult body mass. Additional eGFR calculator available at:        [email or web address]            GFR Staging 03/02/2021 NOT REPORTED   Final    Hepatitis B Surface Ag 03/02/2021 NONREACTIVE  NONREACTIVE Final    Hepatitis C Ab 03/02/2021 NONREACTIVE  NONREACTIVE Final    Comment:       The hepatitis C procedure used in our laboratory is a Chemiluminescent test specific for   three recombinant HCV antigens. A negative anti-HCV result indicates that the antibodies to   hepatitis C virus are not present at this time. Individuals with reactive anti-HCV should be considered infected and infectious until proven   otherwise.   Confirmation of all equivocal or reactive results is recommended by ordering   HCV RNA by PCR.  Hep B Core Ab, IgM 03/02/2021 NONREACTIVE  NONREACTIVE Final    Hep A IgM 03/02/2021 NONREACTIVE  NONREACTIVE Final    HIV Ag/Ab 03/02/2021 NONREACTIVE  NONREACTIVE Final    Comment: No laboratory evidence of HIV infection. If acute HIV infection is suspected, consider   testing for HIV-1 RNA.  Quantiferon TB Minus NIL 03/02/2021 Negative  Negative Final    Comment: (NOTE)  Interpretive Data: Quantiferon TB Gold Plus  Interferon gamma release is measured for specimens from each of   the four collection tubes. A qualitative result (Negative,   Positive, or Indeterminate) is based on interpretation of the four   values, NIL, MITOGEN minus NIL (MITOGEN-NIL), TB1 minus NIL   (TB1-NIL), and TB2 minus NIL (TB2-NIL). The NIL value represents   nonspecific reactivity produced by the patient specimen. The   MITOGEN-NIL value serves as the positive control for the patient   specimen, demonstrating successful lymphocyte activity. The   TB1-NIL tube specifically detects CD4+ lymphocyte reactivity,   specifically stimulated by the TB1 antigens. The TB2-NIL tube   detects both CD4+ and CD8+ lymphocyte reactivity, stimulated by   TB2 antigens. An overall Negative result does not completely rule   out TB infection. A false-positive result in the absence of other clinical evidence   of TB infection is not uncommon. Refer to: Updated Guidelines for   Using Interfe                           bunny Gamma Release Assays to Detect Mycobacterium   tuberculosis Infection --- Marilin Mays, 2010   ([email or web address]), for more   information concerning test performance in low-prevalence   populations and use in occupational screening.       QuantiFERON Mitogen 03/02/2021 9.94  IU/mL Final    Quantiferon TB1 Minus NIL 03/02/2021 0.00  0.00 - 0.34 IU/mL Final    Quantiferon TB2 Minus NIL 03/02/2021 0.02  0.00 - 0.34 IU/mL Final    QuantiFERON Nil 03/02/2021 0.02  IU/mL Final    Comment: (NOTE)  Performed By: H[address]ctor Inf[address]  [place], [facility]  : Francesca Naidu. Mit[address] Test Name 03/02/2021 IMMUNOBULLOUS DISEASE PANEL   Final    Send Out Report 03/02/2021 (NOTE)   Final    Comment: Immunobullous Disease Panel, Epithelial   [facility] test code [id number]   EER Immunobullous Disease Panel   See Note                       Access [facility] Enhanced Report using the link below:  -Direct access: [email or web address]  Performed By: Héctor Granda at [facility]  [address] [place] [address] [address]  [address]  : Elaine Arndt. DO Miguel, MPH  - - - - - - - - - - - - - - - - - - - - - - - - - - - - - -  Immunobullous Disease Panel   See Note                       IMMUNODERMATOLOGY REPORT  Specimen(s):  1. Serum specimen  Clinical/Diagnostic Information:  No clinical information provided.   ____________________________________________________________  DIAGNOSTIC INTERPRETATION   Consistent with pemphigoid (See Results and Comments)  ____________________________________________________________  RESULTS  Indirect Immunofluorescence  ---------------------------------------------------  Basement Membrane Zone (BMZ) IgG and IgA Antibodi                           es  IgG:  Positive, titer greater than 1:40,960 (H), monkey                 esophagus substrate       Positive, epidermal localization (roof), titer                 1:20,480 (H), human split skin substrate   IgA:  Negative, monkey esophagus substrate       Negative, human split skin substrate       Reference Range:          Positive (H) - Titer greater than 1:10         Borderline - Titer 1:10         Negative - Titer less than 1:10       Pattern on Human BMZ Split Skin:         IgG epidermal or epidermal-dermal combined BMZ         antibody pattern = pemphigoid          IgG dermal BMZ antibody pattern = epidermolysis doctor, encounter for    BMI 28.0-28.9,adult    Dyslipidemia  -     ALT; Future  -     AST; Future  -     CBC Auto Differential; Future  -     Lipid Panel; Future  -     TSH with Reflex; Future    HTN, goal below 130/80  -     Magnesium; Future  -     Renal Function Panel; Future    Type 2 diabetes mellitus with hyperglycemia, without long-term current use of insulin (HCC)  -     Hemoglobin A1C; Future  -     Microalbumin, Ur; Future    Transient alteration of awareness  -     MRI BRAIN WO CONTRAST; Future  -     Urinalysis with Microscopic; Future  -     Culture, Urine; Future    Moderate episode of recurrent major depressive disorder (HCC)  -     mirtazapine (REMERON) 15 MG tablet; Take 1 tablet by mouth nightly    Physical debility  -     [facility]    Gastroesophageal reflux disease without esophagitis  -     omeprazole (PRILOSEC) 20 MG delayed release capsule; Take 1 capsule by mouth 2 times daily    Chronic obstructive pulmonary disease, unspecified COPD type ([address]    Atrial fibrillation, unspecified type ([address]    Dietary counseling  -      BMI ABOVE NORMAL F/U    Actinic keratosis    Follow up on labs. Encouraged well balanced diet. Encouraged 150 mins of aerobic activity weekly. Start PPI for GERD. Start remeron for mood. Follow up on imaging. [facility] ordered. See procedure note for cryotherapy. Patient declines having afib and/or COPD. Patient has physical debility and needs assistance with ADLs at home. All questions answered and addressed to patient satisfaction. Patient understands and agrees to the plan. The patient was evaluated and treated today based on the osteopathic principle that each person is a unit of body, mind, and spirit, the body is capable of self-regulation, self-healing, and health maintenance and that structure and function are reciprocally interrelated. Follow-up:   Return in about 6 weeks (around 11/2/2021) for mood/gerd; 20 min appt.       Cristiane Mathis Junior [place]. On the basis of positive PHQ-9 screening (PHQ-9 Total Score: 13), the following plan was implemented: medication prescribed - patient will call for any significant medication side effects or worsening symptoms of depression. Patient will follow-up in 6 week(s) with PCP. BMI was elevated today, and weight loss plan recommended is : conventional weight loss.

## 2021-09-21 NOTE — PROGRESS NOTES
Pre-Malignant Skin Lesion Removal Procedure Note   Pre-operative Diagnosis: Actinic Keratosis  Post-operative Diagnosis: Actinic Keratosis   Locations: anterior scalp; one lesion  Indications: pre-malignant  Anesthesia: n/a  Procedure Details   The risks (including bleeding and infection) and benefits of the procedure and verbal informed consent obtained from patient. The area was frozen with liquid nitrogen. Findings:   Pathognomonic lesion not sent for pathological exam.   Condition:   Stable   Complications:   none. Plan:   1. Instructed to keep the wounds dry and covered for 24-48h and clean thereafter. 2. Warning signs of infection were reviewed. 3. Recommended that the patient use OTC acetaminophen, OTC ibuprofen as needed for pain. 4. Return prn for re-evaluation.     Berkley Catalan DO

## 2021-09-23 RX ORDER — AMOXICILLIN AND CLAVULANATE POTASSIUM 875; 125 MG/1; MG/1
1 TABLET, FILM COATED ORAL 2 TIMES DAILY
Qty: 14 TABLET | Refills: 0 | Status: SHIPPED | OUTPATIENT
Start: 2021-09-23 | End: 2021-09-30

## 2021-09-24 LAB
CULTURE: ABNORMAL
Lab: ABNORMAL
SPECIMEN DESCRIPTION: ABNORMAL

## 2021-09-29 ENCOUNTER — TELEPHONE (OUTPATIENT)
Dept: FAMILY MEDICINE CLINIC | Age: 81
End: 2021-09-29

## 2021-09-29 NOTE — TELEPHONE ENCOUNTER
Tish Robles called regarding referral for [place] [address]  Patient has not been called for home care yet. Called number on referral for [facility] [employer] [facility] as listed. They do not answer the phone with this company name. The phone number went to a skilled nursing center. ([phone]) They cannot connect me to [place] [address]. They looked up and gave me a phone number to try ([phone]) this number did not answer [facility] either. The woman that answered is an answering service. Gave message to see if this is the correct place and see if they have this referral for our patient. She states someone will call back with info    ~~~Need to call Justin Lr when this gets figured out.  [phone]~~~~~

## 2021-10-04 DIAGNOSIS — R53.81 PHYSICAL DEBILITY: Primary | ICD-10-CM

## 2021-10-04 NOTE — TELEPHONE ENCOUNTER
Pt still has not heard from healthcare. Ok to send referral to [address]?   Call Titi Gomez at [phone]

## 2021-11-08 ENCOUNTER — TELEPHONE (OUTPATIENT)
Dept: FAMILY MEDICINE CLINIC | Age: 81
End: 2021-11-08

## 2021-11-08 NOTE — TELEPHONE ENCOUNTER
----- Message from Robles Mckinney sent at 11/8/2021 11:18 AM EST -----  Subject: Medication Problem    QUESTIONS  Name of Medication? mirtazapine (REMERON) 15 MG tablet  Patient-reported dosage and instructions? unknown  What question or problem do you have with the medication? Patient's friend   and healthcare POA, Nitesh Van) Day called to report that patient   doesn't like the way this medication is making him feel. Affect is good,   however, he's getting very restless sleep (covers are on the floor, flops   around all night). Preferred Pharmacy? [id number] [address] [facility] [phone] [place] [phone]  Pharmacy phone number (if available)? [phone]  Additional Information for Provider? Patient stopped taking this med about   one full week ago. He'd like to try something new. FYI? Gerd and thyroid   seem well managed. ---------------------------------------------------------------------------  --------------  Amisha LINK  What is the best way for the office to contact you? OK to leave message on   voicemail  Preferred Call Back Phone Number? [phone]  ---------------------------------------------------------------------------  --------------  SCRIPT ANSWERS  Relationship to Patient? Third Party  Representative Name?  Nitesh Johnson / Friend & HC LAURA

## 2021-11-08 NOTE — TELEPHONE ENCOUNTER
Spoke with Crhistopher Ovalles. He did not come to his appt today because he says he is not sick and does not need to see a doctor.   Christopher Ovalles will call back when the pt  is open to coming in

## 2021-11-17 DIAGNOSIS — F33.1 MODERATE EPISODE OF RECURRENT MAJOR DEPRESSIVE DISORDER (HCC): ICD-10-CM

## 2021-11-17 DIAGNOSIS — E03.9 ADULT HYPOTHYROIDISM: ICD-10-CM

## 2021-11-17 RX ORDER — MIRTAZAPINE 15 MG/1
TABLET, FILM COATED ORAL
Qty: 30 TABLET | Refills: 1 | Status: SHIPPED | OUTPATIENT
Start: 2021-11-17 | End: 2021-12-07 | Stop reason: SDUPTHER

## 2021-11-17 RX ORDER — LEVOTHYROXINE SODIUM 0.05 MG/1
TABLET ORAL
Qty: 30 TABLET | Refills: 1 | Status: SHIPPED | OUTPATIENT
Start: 2021-11-17

## 2021-11-17 NOTE — TELEPHONE ENCOUNTER
Noe Ann is calling to request a refill on the following medication(s):    Medication Request:  Requested Prescriptions     Pending Prescriptions Disp Refills    levothyroxine (SYNTHROID) 50 MCG tablet [Pharmacy Med Name: LEVOTHYROXINE 50 MCG TABLET] 30 tablet 1     Sig: TAKE 1 TABLET BY MOUTH EVERY DAY    mirtazapine (REMERON) 15 MG tablet [Pharmacy Med Name: MIRTAZAPINE 15 MG TABLET] 30 tablet 1     Sig: TAKE 1 TABLET BY MOUTH EVERY DAY AT NIGHT       Last Visit Date (If Applicable):  1/93/1845    Next Visit Date:    4/11/2022

## 2021-12-07 DIAGNOSIS — F33.1 MODERATE EPISODE OF RECURRENT MAJOR DEPRESSIVE DISORDER (HCC): ICD-10-CM

## 2021-12-07 RX ORDER — MIRTAZAPINE 15 MG/1
15 TABLET, FILM COATED ORAL NIGHTLY
Qty: 30 TABLET | Refills: 1 | Status: SHIPPED | OUTPATIENT
Start: 2021-12-07

## 2021-12-07 NOTE — TELEPHONE ENCOUNTER
Kevin Ceja is calling to request a refill on the following medication(s):    Medication Request:  Requested Prescriptions     Pending Prescriptions Disp Refills    mirtazapine (REMERON) 15 MG tablet 30 tablet 1     Sig: Take 1 tablet by mouth nightly       Last Visit Date (If Applicable):  6/03/2321    Next Visit Date:    4/11/2022

## 2022-04-01 ENCOUNTER — TELEPHONE (OUTPATIENT)
Dept: FAMILY MEDICINE CLINIC | Age: 82
End: 2022-04-01

## 2022-04-01 NOTE — TELEPHONE ENCOUNTER
Outreach  For [address] [place] [address] Avenue number number busy, tried mobile number LMOM to call office for AWV

## 2024-03-16 NOTE — PROGRESS NOTES
Subjective:      Patient ID: Mich Argueta is a [de-identified] y.o. male. HPI  Her graft history and physical exam unchanged with the exception as noted below. Patient did have some lumbar epidural steroid injections    These injections provided relief for 2-3 days only. Continued low back pain with bilateral radicular leg pain neurogenic claudication  Review of Systems    Objective:   Physical Exam   Constitutional: He is oriented to person, place, and time. He appears well-developed and well-nourished. HENT:   Head: Normocephalic and atraumatic. Eyes: Conjunctivae and EOM are normal.   Neck: Normal range of motion. Pulmonary/Chest: Effort normal. No respiratory distress. Neurological: He is alert and oriented to person, place, and time. He has normal strength. No sensory deficit. Normal gait   Skin: Skin is warm and dry. Psychiatric: His behavior is normal. Thought content normal.   Nursing note and vitals reviewed. patient slightly short striated wide-based gait uses a cane    X-rays again reviewed patient with severe multilevel lumbar spondylosis previously noted    Assessment:      Encounter Diagnoses   Name Primary?     Chronic midline low back pain with sciatica, sciatica laterality unspecified Yes    Lumbar degenerative disc disease     Scoliosis (and kyphoscoliosis), idiopathic     Acquired spondylolisthesis     Arthritis of right hip            Plan:      MRI lumbar        Rupesh Corey MD - - -

## (undated) DEVICE — FORCEPS BX L240CM WRK CHN 2.8MM STD CAP W/ NDL MIC MESH

## (undated) DEVICE — GOWN,POLY REINFORCED,LG: Brand: MEDLINE

## (undated) DEVICE — DEFENDO AIR WATER SUCTION AND BIOPSY VALVE KIT FOR  OLYMPUS: Brand: DEFENDO AIR/WATER/SUCTION AND BIOPSY VALVE

## (undated) DEVICE — JELLY,LUBE,STERILE,FLIP TOP,TUBE,2-OZ: Brand: MEDLINE

## (undated) DEVICE — BITEBLOCK 54FR W/ DENT RIM BLOX